# Patient Record
Sex: MALE | Race: WHITE | NOT HISPANIC OR LATINO | Employment: FULL TIME | URBAN - METROPOLITAN AREA
[De-identification: names, ages, dates, MRNs, and addresses within clinical notes are randomized per-mention and may not be internally consistent; named-entity substitution may affect disease eponyms.]

---

## 2023-02-28 PROBLEM — E78.2 HYPERLIPIDEMIA, MIXED: Status: ACTIVE | Noted: 2023-02-28

## 2023-02-28 PROBLEM — R79.89 ABNORMAL TSH: Status: ACTIVE | Noted: 2023-02-28

## 2023-02-28 PROBLEM — N13.2 HYDRONEPHROSIS WITH URINARY OBSTRUCTION DUE TO RENAL CALCULUS: Status: ACTIVE | Noted: 2023-02-28

## 2023-02-28 PROBLEM — I10 HYPERTENSION, ESSENTIAL, BENIGN: Status: ACTIVE | Noted: 2023-02-28

## 2023-02-28 PROBLEM — N20.1 URETERAL STONE: Status: ACTIVE | Noted: 2023-02-28

## 2023-02-28 PROBLEM — R16.0 LIVER MASS, RIGHT LOBE: Status: ACTIVE | Noted: 2023-02-28

## 2023-02-28 PROBLEM — E55.9 VITAMIN D DEFICIENCY: Status: ACTIVE | Noted: 2023-02-28

## 2023-02-28 PROBLEM — K57.30 DIVERTICULOSIS OF COLON: Status: ACTIVE | Noted: 2023-02-28

## 2023-02-28 PROBLEM — R53.83 FATIGUE: Status: ACTIVE | Noted: 2023-02-28

## 2023-02-28 PROBLEM — E66.3 OVERWEIGHT WITH BODY MASS INDEX (BMI) OF 26 TO 26.9 IN ADULT: Status: ACTIVE | Noted: 2023-02-28

## 2023-02-28 PROBLEM — Z90.49 HISTORY OF PARTIAL COLECTOMY: Status: ACTIVE | Noted: 2023-02-28

## 2023-02-28 PROBLEM — N32.0 BLADDER OUTLET OBSTRUCTION: Status: ACTIVE | Noted: 2023-02-28

## 2023-02-28 PROBLEM — M54.9 BACK PAIN: Status: ACTIVE | Noted: 2023-02-28

## 2023-02-28 RX ORDER — LOSARTAN POTASSIUM 25 MG/1
1 TABLET ORAL DAILY
COMMUNITY
End: 2023-06-01

## 2023-02-28 RX ORDER — EZETIMIBE 10 MG/1
1 TABLET ORAL DAILY
COMMUNITY
Start: 2022-03-15 | End: 2024-01-05

## 2023-02-28 RX ORDER — ACETAMINOPHEN 500 MG
1 TABLET ORAL DAILY
COMMUNITY

## 2023-02-28 RX ORDER — ASCORBIC ACID 500 MG
1 TABLET ORAL DAILY
COMMUNITY

## 2023-02-28 RX ORDER — GARLIC 1000 MG
1 CAPSULE ORAL DAILY
COMMUNITY

## 2023-04-06 ENCOUNTER — OFFICE VISIT (OUTPATIENT)
Dept: PRIMARY CARE | Facility: CLINIC | Age: 58
End: 2023-04-06
Payer: COMMERCIAL

## 2023-04-06 VITALS
RESPIRATION RATE: 16 BRPM | WEIGHT: 173 LBS | OXYGEN SATURATION: 98 % | HEART RATE: 78 BPM | DIASTOLIC BLOOD PRESSURE: 88 MMHG | HEIGHT: 68 IN | TEMPERATURE: 98 F | BODY MASS INDEX: 26.22 KG/M2 | SYSTOLIC BLOOD PRESSURE: 122 MMHG

## 2023-04-06 DIAGNOSIS — Z00.00 WELL ADULT EXAM: Primary | ICD-10-CM

## 2023-04-06 DIAGNOSIS — R06.09 DYSPNEA ON EXERTION: ICD-10-CM

## 2023-04-06 DIAGNOSIS — I10 HYPERTENSION, ESSENTIAL, BENIGN: ICD-10-CM

## 2023-04-06 DIAGNOSIS — J02.9 SORE THROAT: ICD-10-CM

## 2023-04-06 DIAGNOSIS — H11.431: ICD-10-CM

## 2023-04-06 DIAGNOSIS — I45.10 INCOMPLETE RIGHT BUNDLE BRANCH BLOCK (RBBB): ICD-10-CM

## 2023-04-06 DIAGNOSIS — G47.33 OBSTRUCTIVE SLEEP APNEA (ADULT) (PEDIATRIC): ICD-10-CM

## 2023-04-06 DIAGNOSIS — R07.89 OTHER CHEST PAIN: ICD-10-CM

## 2023-04-06 PROBLEM — R33.9 URINARY RETENTION: Status: ACTIVE | Noted: 2023-04-06

## 2023-04-06 PROBLEM — R39.198 OTHER DIFFICULTIES WITH MICTURITION: Status: ACTIVE | Noted: 2022-09-21

## 2023-04-06 PROBLEM — H52.4 BILATERAL PRESBYOPIA: Status: ACTIVE | Noted: 2017-07-31

## 2023-04-06 PROBLEM — H53.9 VISUAL CHANGES: Status: ACTIVE | Noted: 2023-04-06

## 2023-04-06 PROBLEM — G47.9 SLEEP DISORDER, UNSPECIFIED: Status: ACTIVE | Noted: 2022-12-02

## 2023-04-06 PROCEDURE — 3074F SYST BP LT 130 MM HG: CPT | Performed by: INTERNAL MEDICINE

## 2023-04-06 PROCEDURE — 99396 PREV VISIT EST AGE 40-64: CPT | Performed by: INTERNAL MEDICINE

## 2023-04-06 PROCEDURE — 93000 ELECTROCARDIOGRAM COMPLETE: CPT | Performed by: INTERNAL MEDICINE

## 2023-04-06 PROCEDURE — 1036F TOBACCO NON-USER: CPT | Performed by: INTERNAL MEDICINE

## 2023-04-06 PROCEDURE — 87635 SARS-COV-2 COVID-19 AMP PRB: CPT

## 2023-04-06 PROCEDURE — U0005 INFEC AGEN DETEC AMPLI PROBE: HCPCS

## 2023-04-06 PROCEDURE — 3079F DIAST BP 80-89 MM HG: CPT | Performed by: INTERNAL MEDICINE

## 2023-04-06 ASSESSMENT — PAIN SCALES - GENERAL: PAINLEVEL: 0-NO PAIN

## 2023-04-06 ASSESSMENT — ENCOUNTER SYMPTOMS
OCCASIONAL FEELINGS OF UNSTEADINESS: 0
DEPRESSION: 0
LOSS OF SENSATION IN FEET: 0

## 2023-04-06 ASSESSMENT — PATIENT HEALTH QUESTIONNAIRE - PHQ9
1. LITTLE INTEREST OR PLEASURE IN DOING THINGS: NOT AT ALL
SUM OF ALL RESPONSES TO PHQ9 QUESTIONS 1 AND 2: 0
2. FEELING DOWN, DEPRESSED OR HOPELESS: NOT AT ALL

## 2023-04-08 LAB — SARS-COV-2 RESULT: NOT DETECTED

## 2023-04-17 PROBLEM — I45.10 INCOMPLETE RIGHT BUNDLE BRANCH BLOCK (RBBB): Status: ACTIVE | Noted: 2023-04-17

## 2023-04-17 PROBLEM — R06.09 DYSPNEA ON EXERTION: Status: ACTIVE | Noted: 2023-04-17

## 2023-04-17 NOTE — ASSESSMENT & PLAN NOTE
Desean presents today for his annual wellness exam we reviewed his previous blood work and during the course of our discussion we reviewed his family history of coronary artery disease and a father who had a CABG in his early 60s.  He does admit to having some shortness of breath with activity and this has been a change for him.  We discussed options here and will order stress testing and consider cardiology evaluation prior to ordering additional studies.  His EKG shows no acute changes although it does show a right bundle branch block.

## 2023-04-27 ENCOUNTER — OFFICE VISIT (OUTPATIENT)
Dept: PRIMARY CARE | Facility: CLINIC | Age: 58
End: 2023-04-27
Payer: COMMERCIAL

## 2023-04-27 VITALS
OXYGEN SATURATION: 95 % | SYSTOLIC BLOOD PRESSURE: 110 MMHG | DIASTOLIC BLOOD PRESSURE: 76 MMHG | WEIGHT: 171.2 LBS | HEIGHT: 68 IN | TEMPERATURE: 97.6 F | HEART RATE: 74 BPM | BODY MASS INDEX: 25.94 KG/M2 | RESPIRATION RATE: 18 BRPM

## 2023-04-27 DIAGNOSIS — K21.9 GASTROESOPHAGEAL REFLUX DISEASE, UNSPECIFIED WHETHER ESOPHAGITIS PRESENT: ICD-10-CM

## 2023-04-27 DIAGNOSIS — R05.1 ACUTE COUGH: Primary | ICD-10-CM

## 2023-04-27 DIAGNOSIS — E78.2 HYPERLIPIDEMIA, MIXED: ICD-10-CM

## 2023-04-27 PROBLEM — H11.431 CONJUNCTIVAL HYPEREMIA, RIGHT EYE: Status: ACTIVE | Noted: 2023-04-06

## 2023-04-27 PROBLEM — G47.33 OBSTRUCTIVE SLEEP APNEA (ADULT) (PEDIATRIC): Status: RESOLVED | Noted: 2022-12-02 | Resolved: 2023-04-27

## 2023-04-27 PROCEDURE — 3078F DIAST BP <80 MM HG: CPT | Performed by: INTERNAL MEDICINE

## 2023-04-27 PROCEDURE — 1036F TOBACCO NON-USER: CPT | Performed by: INTERNAL MEDICINE

## 2023-04-27 PROCEDURE — 99213 OFFICE O/P EST LOW 20 MIN: CPT | Performed by: INTERNAL MEDICINE

## 2023-04-27 PROCEDURE — 3074F SYST BP LT 130 MM HG: CPT | Performed by: INTERNAL MEDICINE

## 2023-04-27 RX ORDER — PANTOPRAZOLE SODIUM 40 MG/1
40 TABLET, DELAYED RELEASE ORAL DAILY
Qty: 90 TABLET | Refills: 3 | Status: SHIPPED | OUTPATIENT
Start: 2023-04-27 | End: 2024-04-05

## 2023-04-27 ASSESSMENT — ENCOUNTER SYMPTOMS
SHORTNESS OF BREATH: 0
COUGH: 1
FEVER: 0

## 2023-04-27 ASSESSMENT — PAIN SCALES - GENERAL: PAINLEVEL: 0-NO PAIN

## 2023-04-27 NOTE — PROGRESS NOTES
"Subjective   Yeison Olivera is a 58 y.o. male who presents for sore throat, red eye.  Patient did have his stress test done   Patient did not get labs done     Patient states that the red eye hasn't happened since the last visit   Sore throat is gone but now has a productive cough mainly in the morning, has been taking some allergy medicine and it seems to help a little       Cough  This is a recurrent problem. The current episode started 1 to 4 weeks ago. The problem has been gradually improving. The problem occurs hourly. The cough is Productive of sputum. Associated symptoms include nasal congestion. Pertinent negatives include no chest pain, fever or shortness of breath. Nothing aggravates the symptoms. The treatment provided mild relief.       Review of Systems   Constitutional:  Negative for fever.   Respiratory:  Positive for cough. Negative for shortness of breath.    Cardiovascular:  Negative for chest pain.       Objective   /76   Pulse 74   Temp 36.4 °C (97.6 °F)   Resp 18   Ht 1.734 m (5' 8.25\")   Wt 77.7 kg (171 lb 3.2 oz)   SpO2 95%   BMI 25.84 kg/m²       Physical Exam  Constitutional:       Appearance: Normal appearance.   HENT:      Head: Normocephalic.   Cardiovascular:      Rate and Rhythm: Normal rate and regular rhythm.   Pulmonary:      Effort: Pulmonary effort is normal.   Musculoskeletal:      Cervical back: Neck supple.      Right lower leg: No edema.      Left lower leg: No edema.   Skin:     General: Skin is warm and dry.   Psychiatric:         Mood and Affect: Mood normal.         Assessment/Plan   Problem List Items Addressed This Visit          Digestive    Gastroesophageal reflux disease    Relevant Medications    pantoprazole (ProtoNix) 40 mg EC tablet       Other    Hyperlipidemia, mixed    Acute cough - Primary    Relevant Medications    pantoprazole (ProtoNix) 40 mg EC tablet    Other Relevant Orders    Colonoscopy    EGD     Encounter Diagnoses   Name Primary?    " Acute cough Yes    Gastroesophageal reflux disease, unspecified whether esophagitis present     Hyperlipidemia, mixed      Daniel Juan, DO

## 2023-06-01 DIAGNOSIS — I10 HYPERTENSION, ESSENTIAL, BENIGN: Primary | ICD-10-CM

## 2023-06-01 RX ORDER — LOSARTAN POTASSIUM 25 MG/1
TABLET ORAL
Qty: 90 TABLET | Refills: 3 | Status: SHIPPED | OUTPATIENT
Start: 2023-06-01 | End: 2024-05-28

## 2023-10-27 ENCOUNTER — TELEPHONE (OUTPATIENT)
Dept: PRIMARY CARE | Facility: CLINIC | Age: 58
End: 2023-10-27

## 2023-10-29 DIAGNOSIS — Z00.00 WELL ADULT HEALTH CHECK: Primary | ICD-10-CM

## 2023-12-04 ENCOUNTER — ANESTHESIA EVENT (OUTPATIENT)
Dept: GASTROENTEROLOGY | Facility: EXTERNAL LOCATION | Age: 58
End: 2023-12-04

## 2023-12-12 ENCOUNTER — HOSPITAL ENCOUNTER (OUTPATIENT)
Dept: GASTROENTEROLOGY | Facility: EXTERNAL LOCATION | Age: 58
Discharge: HOME | End: 2023-12-12
Payer: COMMERCIAL

## 2023-12-12 ENCOUNTER — ANESTHESIA (OUTPATIENT)
Dept: GASTROENTEROLOGY | Facility: EXTERNAL LOCATION | Age: 58
End: 2023-12-12

## 2023-12-12 VITALS
BODY MASS INDEX: 26.11 KG/M2 | SYSTOLIC BLOOD PRESSURE: 140 MMHG | TEMPERATURE: 96.8 F | RESPIRATION RATE: 22 BRPM | OXYGEN SATURATION: 98 % | HEART RATE: 65 BPM | DIASTOLIC BLOOD PRESSURE: 93 MMHG | WEIGHT: 173 LBS

## 2023-12-12 DIAGNOSIS — Z00.00 WELL ADULT HEALTH CHECK: ICD-10-CM

## 2023-12-12 PROCEDURE — 88305 TISSUE EXAM BY PATHOLOGIST: CPT

## 2023-12-12 PROCEDURE — 0753T DGTZ GLS MCRSCP SLD LEVEL IV: CPT

## 2023-12-12 PROCEDURE — 45385 COLONOSCOPY W/LESION REMOVAL: CPT | Performed by: STUDENT IN AN ORGANIZED HEALTH CARE EDUCATION/TRAINING PROGRAM

## 2023-12-12 PROCEDURE — 88305 TISSUE EXAM BY PATHOLOGIST: CPT | Performed by: PATHOLOGY

## 2023-12-12 RX ORDER — SODIUM CHLORIDE 9 MG/ML
20 INJECTION, SOLUTION INTRAVENOUS CONTINUOUS
Status: DISCONTINUED | OUTPATIENT
Start: 2023-12-12 | End: 2023-12-13 | Stop reason: HOSPADM

## 2023-12-12 RX ORDER — ONDANSETRON HYDROCHLORIDE 2 MG/ML
4 INJECTION, SOLUTION INTRAVENOUS ONCE AS NEEDED
Status: DISCONTINUED | OUTPATIENT
Start: 2023-12-12 | End: 2023-12-13 | Stop reason: HOSPADM

## 2023-12-12 RX ORDER — PROPOFOL 10 MG/ML
INJECTION, EMULSION INTRAVENOUS AS NEEDED
Status: DISCONTINUED | OUTPATIENT
Start: 2023-12-12 | End: 2023-12-12

## 2023-12-12 RX ORDER — LIDOCAINE HYDROCHLORIDE 20 MG/ML
INJECTION, SOLUTION INFILTRATION; PERINEURAL AS NEEDED
Status: DISCONTINUED | OUTPATIENT
Start: 2023-12-12 | End: 2023-12-12

## 2023-12-12 RX ADMIN — PROPOFOL 50 MG: 10 INJECTION, EMULSION INTRAVENOUS at 14:04

## 2023-12-12 RX ADMIN — PROPOFOL 50 MG: 10 INJECTION, EMULSION INTRAVENOUS at 13:51

## 2023-12-12 RX ADMIN — SODIUM CHLORIDE: 9 INJECTION, SOLUTION INTRAVENOUS at 13:44

## 2023-12-12 RX ADMIN — PROPOFOL 30 MG: 10 INJECTION, EMULSION INTRAVENOUS at 14:14

## 2023-12-12 RX ADMIN — PROPOFOL 50 MG: 10 INJECTION, EMULSION INTRAVENOUS at 13:49

## 2023-12-12 RX ADMIN — PROPOFOL 50 MG: 10 INJECTION, EMULSION INTRAVENOUS at 13:55

## 2023-12-12 RX ADMIN — PROPOFOL 50 MG: 10 INJECTION, EMULSION INTRAVENOUS at 13:53

## 2023-12-12 RX ADMIN — PROPOFOL 100 MG: 10 INJECTION, EMULSION INTRAVENOUS at 13:48

## 2023-12-12 RX ADMIN — PROPOFOL 50 MG: 10 INJECTION, EMULSION INTRAVENOUS at 13:59

## 2023-12-12 RX ADMIN — PROPOFOL 20 MG: 10 INJECTION, EMULSION INTRAVENOUS at 14:10

## 2023-12-12 RX ADMIN — PROPOFOL 50 MG: 10 INJECTION, EMULSION INTRAVENOUS at 13:57

## 2023-12-12 RX ADMIN — PROPOFOL 50 MG: 10 INJECTION, EMULSION INTRAVENOUS at 14:01

## 2023-12-12 RX ADMIN — LIDOCAINE HYDROCHLORIDE 5 ML: 20 INJECTION, SOLUTION INFILTRATION; PERINEURAL at 13:48

## 2023-12-12 ASSESSMENT — PAIN - FUNCTIONAL ASSESSMENT
PAIN_FUNCTIONAL_ASSESSMENT: 0-10

## 2023-12-12 ASSESSMENT — PAIN SCALES - GENERAL
PAINLEVEL_OUTOF10: 0 - NO PAIN
PAINLEVEL_OUTOF10: 0 - NO PAIN
PAIN_LEVEL: 0
PAINLEVEL_OUTOF10: 0 - NO PAIN
PAINLEVEL_OUTOF10: 0 - NO PAIN

## 2023-12-12 ASSESSMENT — COLUMBIA-SUICIDE SEVERITY RATING SCALE - C-SSRS
1. IN THE PAST MONTH, HAVE YOU WISHED YOU WERE DEAD OR WISHED YOU COULD GO TO SLEEP AND NOT WAKE UP?: NO
6. HAVE YOU EVER DONE ANYTHING, STARTED TO DO ANYTHING, OR PREPARED TO DO ANYTHING TO END YOUR LIFE?: NO
2. HAVE YOU ACTUALLY HAD ANY THOUGHTS OF KILLING YOURSELF?: NO

## 2023-12-12 NOTE — ANESTHESIA PREPROCEDURE EVALUATION
Patient: Yeison Olivera    Procedure Information       Date/Time: 12/12/23 1230    Scheduled providers: Elsy Brunner MD    Procedure: COLONOSCOPY    Location: [facility]            Relevant Problems   Cardiovascular   (+) Hyperlipidemia, mixed   (+) Hypertension, essential, benign   (+) Incomplete right bundle branch block (RBBB)      GI   (+) Gastroesophageal reflux disease      /Renal   (+) Hydronephrosis with urinary obstruction due to renal calculus      Pulmonary   (+) Dyspnea on exertion       Clinical information reviewed:    Allergies                NPO Detail:  No data recorded     Physical Exam    Airway  Mallampati: II  TM distance: >3 FB  Neck ROM: full     Cardiovascular   Rhythm: regular     Dental    Pulmonary    Abdominal            Anesthesia Plan    ASA 2     MAC     intravenous induction   Anesthetic plan and risks discussed with patient.    Plan discussed with CRNA.

## 2023-12-12 NOTE — ANESTHESIA POSTPROCEDURE EVALUATION
Patient: Yeison Olivera    Procedure Summary       Date: 12/12/23 Room / Location: [facility]    Anesthesia Start: 1344 Anesthesia Stop: 1428    Procedure: COLONOSCOPY Diagnosis: Well adult health check    Scheduled Providers: Elsy Brunner MD Responsible Provider: ZELDA Hall    Anesthesia Type: MAC ASA Status: 2            Anesthesia Type: MAC    Vitals Value Taken Time   /96 12/12/23 1424   Temp 36 °C (96.8 °F) 12/12/23 1424   Pulse 74 12/12/23 1424   Resp 18 12/12/23 1424   SpO2 96 % 12/12/23 1424       Anesthesia Post Evaluation    Patient location during evaluation: bedside  Patient participation: complete - patient participated  Level of consciousness: awake  Pain score: 0  Pain management: adequate  Airway patency: patent  Cardiovascular status: acceptable  Respiratory status: acceptable  Hydration status: acceptable  Postoperative Nausea and Vomiting: none    There were no known notable events for this encounter.

## 2023-12-12 NOTE — H&P
Procedure H&P    Patient Profile-Procedures  Name Yeison Olivera  Date of Birth 1965  MRN [id number]  Address   [address] [place] [id number] [place] [address]    Primary Phone Number [phone]  Secondary Phone Number    PCP Daniel Juan    Procedure(s):  Procedures: Colonoscopy  Primary contact name and number   Extended Emergency Contact Information  Primary Emergency Contact: Geena Olivera  Home Phone: [phone]  Relation: Spouse    General Health  Weight   Vitals:    12/12/23 1218   Weight: 78.5 kg (173 lb)     BMI Body mass index is 26.11 kg/m².    Allergies  No Known Allergies    Past Medical History   Past Medical History:   Diagnosis Date    Allergic contact dermatitis due to plants, except food 09/21/2020    Contact dermatitis due to poison ivy    Bladder-neck obstruction 03/15/2022    Bladder outlet obstruction    Contact with and (suspected) exposure to covid-19 12/04/2020    Suspected COVID-19 virus infection    Hypertension     Personal history of other diseases of the digestive system 10/26/2020    History of diverticulitis of colon    Personal history of other diseases of urinary system 09/22/2022    History of urethral stricture    Personal history of other specified conditions 02/14/2020    History of snoring    Personal history of other specified conditions 02/01/2021    History of headache    Somnolence 02/14/2020    Daytime somnolence    Unspecified anterior urethral stricture, male     Stricture of anterior urethra in male, unspecified stricture type       Provider assessment  Diagnosis: Colon Cancer Screening/Surveillance   Medication Reviewed - yes  Prior to Admission medications    Medication Sig Start Date End Date Taking? Authorizing Provider   APPLE CIDER VINEGAR ORAL Take 1 capsule by mouth once daily.   Yes Historical Provider, MD   ascorbic acid (Vitamin C) 500 mg tablet Take 1 tablet (500 mg) by mouth once daily.   Yes Historical  Provider, MD   cholecalciferol (Vitamin D-3) 5,000 Units tablet Take 1 tablet (5,000 Units) by mouth once daily.   Yes Historical Provider, MD   ezetimibe (Zetia) 10 mg tablet Take 1 tablet (10 mg) by mouth once daily. 3/15/22  Yes Historical Provider, MD   fish oil concentrate (Omega-3) 120-180 mg capsule Take 1 capsule (1 g) by mouth once daily. 3/15/22  Yes Historical Provider, MD   garlic 1,000 mg capsule Take 1 capsule by mouth once daily.   Yes Historical Provider, MD   losartan (Cozaar) 25 mg tablet TAKE 1 TABLET DAILY 6/1/23  Yes Festus Caldera APRN-CNP   pantoprazole (ProtoNix) 40 mg EC tablet Take 1 tablet (40 mg) by mouth once daily. Do not crush, chew, or split. 4/27/23 4/26/24 Yes Daniel Juan, DO   vit E acet/selenium (VITAMIN E ACETATE-SELENIUM ORAL) Take 1 capsule by mouth once daily. Vitamin E/Selenium 400-50 UNIT-MCG CAPS   Yes Historical Provider, MD   vitamin-B complex split tablet Take 1 tablet (2 half tablet) by mouth once daily. Super Stress B-Complex CR TBCR   Yes Historical Provider, MD       Physical Exam  Vitals:    12/12/23 1218   BP: (!) 138/107   Pulse: 79   Resp: 19   Temp: 36.4 °C (97.5 °F)   SpO2: 98%        General: A&Ox3, NAD.  HEENT: AT/NC.   CV: RRR. No murmur.  Resp: CTA bilaterally. No wheezing, rhonchi or rales.   GI: Soft, NT/ND. BSx4.  Extrem: No edema. Pulses intact.  Skin: No Jaundice.   Neuro: No focal deficits.   Psych: Normal mood and affect.      Procedure Plan - pre-procedural (re)assesment completed by physician:  discharge/transfer patient when discharge criteria met    Elsy Brunner MD  12/12/2023 1:40 PM

## 2023-12-12 NOTE — DISCHARGE INSTRUCTIONS
Patient Instructions Post Procedure      The anesthetics, sedatives or narcotics which were given to you today will be acting in your body for the next 24 hours, so you might feel a little sleepy or groggy.  This feeling should slowly wear off. Carefully read and follow the instructions.     You received sedation today:  - Do not drive or operate any machinery or power tools of any kind.   - No alcoholic beverages today, not even beer or wine.  - Do not make any important decisions or sign any legal documents.  - No over the counter medications that contain alcohol or that may cause drowsiness.    While it is common to experience mild to moderate abdominal distention, gas, or belching after your procedure, if any of these symptoms occur following discharge from the GI Lab or within one week of having your procedure, call the [facility] to be advised whether a visit to your nearest Urgent Care or Emergency Department is indicated.  Take this paper with you if you go.   - If you develop an allergic reaction to the medications that were given during your procedure such as difficulty breathing, rash, hives, severe nausea, vomiting or lightheadedness.  - If you experience chest pain, shortness of breath, severe abdominal pain, fevers and chills.  -If you develop signs and symptoms of bleeding such as blood in your spit, if your stools turn black, tarry, or bloody  - If you have not urinated within 8 hours following your procedure.  - If your IV site becomes painful, red, inflamed, or looks infected.    If you received a biopsy/polypectomy/sphincterotomy the following instructions apply below:  __ Do not use Aspirin containing products, non-steroidal medications or anti-coagulants for one week following your procedure. (Examples of these types of medications are: Advil, Arthrotec, Aleve, Coumadin, Ecotrin, Heparin, Ibuprofen, Indocin, Motrin, Naprosyn, Nuprin, Plavix, Vioxx, and Voltarin, or their generic  forms.  This list is not all-inclusive.  Check with your physician or pharmacist before resuming medications.)   __ Eat a soft diet today.  Avoid foods that are poorly digested for the next 24 hours.  These foods would include: nuts, beans, lettuce, red meats, and fried foods. Start with liquids and advance your diet as tolerated, gradually work up to eating solids.   __ Do not have a Barium Study or Enema for one week.    Your physician recommends the additional following instructions:    -You have a contact number available for emergencies. The signs and symptoms of potential delayed complications were discussed with you. You may return to normal activities tomorrow.  -Resume your previous diet or other if specified.  -Continue your present medications.   -We are waiting for your pathology results, if applicable.  -The findings and recommendations have been discussed with you and/or family.  - Please see Medication Reconciliation Form for new medication/medications prescribed.     If you experience any problems or have any questions following discharge from the GI Lab, please call: [phone] from 7 am- 4:30 pm.  In the event of an emergency please go to the closest Emergency Department or call

## 2023-12-12 NOTE — Clinical Note
Patient tolerated the procedure well and is comfortable with no complaints of pain. Vital signs stable. Arousable prior to transport. Patient transported to PACU via stretcher. Handoff completed. Abd soft non tender

## 2023-12-21 LAB
LABORATORY COMMENT REPORT: NORMAL
PATH REPORT.FINAL DX SPEC: NORMAL
PATH REPORT.GROSS SPEC: NORMAL
PATH REPORT.TOTAL CANCER: NORMAL

## 2024-01-05 ENCOUNTER — TELEPHONE (OUTPATIENT)
Dept: PRIMARY CARE | Facility: CLINIC | Age: 59
End: 2024-01-05

## 2024-01-05 DIAGNOSIS — Z12.5 ENCOUNTER FOR PROSTATE CANCER SCREENING: Primary | ICD-10-CM

## 2024-01-05 DIAGNOSIS — E55.9 VITAMIN D DEFICIENCY: ICD-10-CM

## 2024-01-05 DIAGNOSIS — Z00.00 WELL ADULT HEALTH CHECK: ICD-10-CM

## 2024-01-05 DIAGNOSIS — E78.2 MIXED HYPERLIPIDEMIA: ICD-10-CM

## 2024-01-05 RX ORDER — EZETIMIBE 10 MG/1
10 TABLET ORAL DAILY
Qty: 90 TABLET | Refills: 3 | Status: SHIPPED | OUTPATIENT
Start: 2024-01-05

## 2024-03-05 ENCOUNTER — OFFICE VISIT (OUTPATIENT)
Dept: PRIMARY CARE | Facility: CLINIC | Age: 59
End: 2024-03-05
Payer: COMMERCIAL

## 2024-03-05 VITALS
HEIGHT: 68 IN | WEIGHT: 172.6 LBS | HEART RATE: 73 BPM | RESPIRATION RATE: 14 BRPM | DIASTOLIC BLOOD PRESSURE: 68 MMHG | BODY MASS INDEX: 26.16 KG/M2 | SYSTOLIC BLOOD PRESSURE: 124 MMHG | OXYGEN SATURATION: 98 %

## 2024-03-05 DIAGNOSIS — H66.90 ACUTE OTITIS MEDIA, UNSPECIFIED OTITIS MEDIA TYPE: Primary | ICD-10-CM

## 2024-03-05 DIAGNOSIS — R42 VERTIGO: ICD-10-CM

## 2024-03-05 PROCEDURE — 99212 OFFICE O/P EST SF 10 MIN: CPT | Performed by: NURSE PRACTITIONER

## 2024-03-05 PROCEDURE — 3074F SYST BP LT 130 MM HG: CPT | Performed by: NURSE PRACTITIONER

## 2024-03-05 PROCEDURE — 1036F TOBACCO NON-USER: CPT | Performed by: NURSE PRACTITIONER

## 2024-03-05 PROCEDURE — 3078F DIAST BP <80 MM HG: CPT | Performed by: NURSE PRACTITIONER

## 2024-03-05 RX ORDER — AMOXICILLIN AND CLAVULANATE POTASSIUM 875; 125 MG/1; MG/1
1 TABLET, FILM COATED ORAL 2 TIMES DAILY
Qty: 14 TABLET | Refills: 0 | Status: SHIPPED | OUTPATIENT
Start: 2024-03-05 | End: 2024-03-12

## 2024-03-05 RX ORDER — SCOLOPAMINE TRANSDERMAL SYSTEM 1 MG/1
1 PATCH, EXTENDED RELEASE TRANSDERMAL
Qty: 5 PATCH | Refills: 0 | Status: SHIPPED | OUTPATIENT
Start: 2024-03-05 | End: 2024-04-04

## 2024-03-05 ASSESSMENT — ENCOUNTER SYMPTOMS
FEVER: 0
LIGHT-HEADEDNESS: 0
DIZZINESS: 1
COUGH: 0
SHORTNESS OF BREATH: 0
HEADACHES: 0

## 2024-03-05 NOTE — PROGRESS NOTES
"Subjective   Patient ID: Yeison Olivera is a 58 y.o. male who presents for Dizziness (Sunday afternoon/ pt was sick last week/ Throwing up/) and Vertigo (Possible).    He has a history of vertigo but this time is worse, meclizine is not helping and feels congested and ears feel full.          Review of Systems   Constitutional:  Negative for fever.   HENT:  Positive for congestion.    Respiratory:  Negative for cough and shortness of breath.    Neurological:  Positive for dizziness. Negative for light-headedness and headaches.       Objective   /68   Pulse 73   Resp 14   Ht 1.727 m (5' 8\")   Wt 78.3 kg (172 lb 9.6 oz)   SpO2 98%   BMI 26.24 kg/m²     Physical Exam  Vitals and nursing note reviewed.   Constitutional:       General: He is not in acute distress.  HENT:      Head: Normocephalic and atraumatic.      Right Ear: No middle ear effusion.      Left Ear: Tenderness present. A middle ear effusion is present. Tympanic membrane is bulging.   Eyes:      Pupils: Pupils are equal, round, and reactive to light.   Cardiovascular:      Rate and Rhythm: Normal rate and regular rhythm.   Pulmonary:      Effort: Pulmonary effort is normal.      Breath sounds: Normal breath sounds.   Skin:     General: Skin is warm and dry.   Neurological:      Mental Status: He is alert.   Psychiatric:         Mood and Affect: Mood normal.         Assessment/Plan   Problem List Items Addressed This Visit    None  Visit Diagnoses         Codes    Acute otitis media, unspecified otitis media type    -  Primary H66.90    Relevant Medications    amoxicillin-pot clavulanate (Augmentin) 875-125 mg tablet    Vertigo     R42    Relevant Medications    scopolamine (Transderm-Scop) 1 mg over 3 days patch 3 day               "

## 2024-03-05 NOTE — LETTER
March 5, 2024     Patient: Yeison Olivera   YOB: 1965   Date of Visit: 3/5/2024       To Whom It May Concern:    Yeison Olivera was seen in my clinic on 3/5/2024 at 3:40 pm. Please excuse Yeison for his absence from work on this day to make the appointment and 3/4/24 and 3/6/24    If you have any questions or concerns, please don't hesitate to call.         Sincerely,         Anastasia Baker, APRN-CNP        CC: No Recipients

## 2024-03-26 ENCOUNTER — TELEPHONE (OUTPATIENT)
Dept: PRIMARY CARE | Facility: CLINIC | Age: 59
End: 2024-03-26
Payer: COMMERCIAL

## 2024-04-04 DIAGNOSIS — R05.1 ACUTE COUGH: ICD-10-CM

## 2024-04-04 DIAGNOSIS — K21.9 GASTROESOPHAGEAL REFLUX DISEASE, UNSPECIFIED WHETHER ESOPHAGITIS PRESENT: ICD-10-CM

## 2024-04-05 RX ORDER — PANTOPRAZOLE SODIUM 40 MG/1
40 TABLET, DELAYED RELEASE ORAL DAILY
Qty: 90 TABLET | Refills: 3 | Status: SHIPPED | OUTPATIENT
Start: 2024-04-05 | End: 2024-05-07 | Stop reason: SDUPTHER

## 2024-04-09 ENCOUNTER — APPOINTMENT (OUTPATIENT)
Dept: PRIMARY CARE | Facility: CLINIC | Age: 59
End: 2024-04-09
Payer: COMMERCIAL

## 2024-04-27 ENCOUNTER — LAB (OUTPATIENT)
Dept: LAB | Facility: LAB | Age: 59
End: 2024-04-27
Payer: COMMERCIAL

## 2024-04-27 DIAGNOSIS — E55.9 VITAMIN D DEFICIENCY: ICD-10-CM

## 2024-04-27 DIAGNOSIS — Z12.5 ENCOUNTER FOR PROSTATE CANCER SCREENING: ICD-10-CM

## 2024-04-27 DIAGNOSIS — Z00.00 WELL ADULT HEALTH CHECK: ICD-10-CM

## 2024-04-27 LAB
25(OH)D3 SERPL-MCNC: 54 NG/ML (ref 30–100)
ALBUMIN SERPL BCP-MCNC: 4.5 G/DL (ref 3.4–5)
ALP SERPL-CCNC: 59 U/L (ref 33–120)
ALT SERPL W P-5'-P-CCNC: 24 U/L (ref 10–52)
ANION GAP SERPL CALC-SCNC: 11 MMOL/L (ref 10–20)
AST SERPL W P-5'-P-CCNC: 20 U/L (ref 9–39)
BILIRUB SERPL-MCNC: 0.6 MG/DL (ref 0–1.2)
BUN SERPL-MCNC: 12 MG/DL (ref 6–23)
CALCIUM SERPL-MCNC: 8.6 MG/DL (ref 8.6–10.3)
CHLORIDE SERPL-SCNC: 107 MMOL/L (ref 98–107)
CHOLEST SERPL-MCNC: 192 MG/DL (ref 0–199)
CHOLESTEROL/HDL RATIO: 4.7
CO2 SERPL-SCNC: 26 MMOL/L (ref 21–32)
CREAT SERPL-MCNC: 1.03 MG/DL (ref 0.5–1.3)
EGFRCR SERPLBLD CKD-EPI 2021: 84 ML/MIN/1.73M*2
ERYTHROCYTE [DISTWIDTH] IN BLOOD BY AUTOMATED COUNT: 12.8 % (ref 11.5–14.5)
GLUCOSE SERPL-MCNC: 120 MG/DL (ref 74–99)
HCT VFR BLD AUTO: 47.3 % (ref 41–52)
HDLC SERPL-MCNC: 41 MG/DL
HGB BLD-MCNC: 16.6 G/DL (ref 13.5–17.5)
LDLC SERPL CALC-MCNC: 121 MG/DL
MCH RBC QN AUTO: 30.2 PG (ref 26–34)
MCHC RBC AUTO-ENTMCNC: 35.1 G/DL (ref 32–36)
MCV RBC AUTO: 86 FL (ref 80–100)
NON HDL CHOLESTEROL: 151 MG/DL (ref 0–149)
NRBC BLD-RTO: 0 /100 WBCS (ref 0–0)
PLATELET # BLD AUTO: 207 X10*3/UL (ref 150–450)
POTASSIUM SERPL-SCNC: 3.9 MMOL/L (ref 3.5–5.3)
PROT SERPL-MCNC: 7.1 G/DL (ref 6.4–8.2)
PSA SERPL-MCNC: 0.44 NG/ML
RBC # BLD AUTO: 5.5 X10*6/UL (ref 4.5–5.9)
SODIUM SERPL-SCNC: 140 MMOL/L (ref 136–145)
T4 FREE SERPL-MCNC: 0.51 NG/DL (ref 0.61–1.12)
TRIGL SERPL-MCNC: 151 MG/DL (ref 0–149)
TSH SERPL-ACNC: 4.56 MIU/L (ref 0.44–3.98)
VLDL: 30 MG/DL (ref 0–40)
WBC # BLD AUTO: 4.6 X10*3/UL (ref 4.4–11.3)

## 2024-04-27 PROCEDURE — 84443 ASSAY THYROID STIM HORMONE: CPT

## 2024-04-27 PROCEDURE — 84439 ASSAY OF FREE THYROXINE: CPT

## 2024-04-27 PROCEDURE — 85027 COMPLETE CBC AUTOMATED: CPT

## 2024-04-27 PROCEDURE — 80053 COMPREHEN METABOLIC PANEL: CPT

## 2024-04-27 PROCEDURE — 84153 ASSAY OF PSA TOTAL: CPT

## 2024-04-27 PROCEDURE — 82306 VITAMIN D 25 HYDROXY: CPT

## 2024-04-27 PROCEDURE — 80061 LIPID PANEL: CPT

## 2024-04-27 PROCEDURE — 36415 COLL VENOUS BLD VENIPUNCTURE: CPT

## 2024-05-07 ENCOUNTER — OFFICE VISIT (OUTPATIENT)
Dept: PRIMARY CARE | Facility: CLINIC | Age: 59
End: 2024-05-07
Payer: COMMERCIAL

## 2024-05-07 VITALS
HEIGHT: 68 IN | TEMPERATURE: 97.8 F | OXYGEN SATURATION: 98 % | WEIGHT: 173.6 LBS | RESPIRATION RATE: 16 BRPM | BODY MASS INDEX: 26.31 KG/M2 | HEART RATE: 68 BPM | SYSTOLIC BLOOD PRESSURE: 114 MMHG | DIASTOLIC BLOOD PRESSURE: 66 MMHG

## 2024-05-07 DIAGNOSIS — K21.9 GASTROESOPHAGEAL REFLUX DISEASE, UNSPECIFIED WHETHER ESOPHAGITIS PRESENT: ICD-10-CM

## 2024-05-07 DIAGNOSIS — S43.421A SPRAIN OF RIGHT ROTATOR CUFF CAPSULE, INITIAL ENCOUNTER: ICD-10-CM

## 2024-05-07 DIAGNOSIS — E78.2 HYPERLIPIDEMIA, MIXED: ICD-10-CM

## 2024-05-07 DIAGNOSIS — I10 HYPERTENSION, ESSENTIAL, BENIGN: ICD-10-CM

## 2024-05-07 DIAGNOSIS — E66.3 OVERWEIGHT WITH BODY MASS INDEX (BMI) OF 26 TO 26.9 IN ADULT: ICD-10-CM

## 2024-05-07 DIAGNOSIS — R79.89 ABNORMAL TSH: ICD-10-CM

## 2024-05-07 DIAGNOSIS — M75.41 ROTATOR CUFF IMPINGEMENT SYNDROME, RIGHT: ICD-10-CM

## 2024-05-07 DIAGNOSIS — Z00.00 WELL ADULT HEALTH CHECK: Primary | ICD-10-CM

## 2024-05-07 PROBLEM — D18.03 HEPATIC HEMANGIOMA: Status: ACTIVE | Noted: 2023-02-28

## 2024-05-07 PROBLEM — N32.0 BLADDER OUTLET OBSTRUCTION: Status: RESOLVED | Noted: 2023-02-28 | Resolved: 2024-05-07

## 2024-05-07 PROBLEM — K57.32 DIVERTICULITIS OF COLON: Status: RESOLVED | Noted: 2023-02-28 | Resolved: 2024-05-07

## 2024-05-07 PROBLEM — R39.198 OTHER DIFFICULTIES WITH MICTURITION: Status: RESOLVED | Noted: 2022-09-21 | Resolved: 2024-05-07

## 2024-05-07 PROBLEM — G47.9 SLEEP DISORDER, UNSPECIFIED: Status: RESOLVED | Noted: 2022-12-02 | Resolved: 2024-05-07

## 2024-05-07 PROBLEM — K57.32 DIVERTICULITIS OF COLON: Status: ACTIVE | Noted: 2023-02-28

## 2024-05-07 PROBLEM — R53.83 FATIGUE: Status: RESOLVED | Noted: 2023-02-28 | Resolved: 2024-05-07

## 2024-05-07 PROBLEM — N13.2 HYDRONEPHROSIS WITH URINARY OBSTRUCTION DUE TO RENAL CALCULUS: Status: RESOLVED | Noted: 2023-02-28 | Resolved: 2024-05-07

## 2024-05-07 PROBLEM — R05.1 ACUTE COUGH: Status: RESOLVED | Noted: 2023-04-27 | Resolved: 2024-05-07

## 2024-05-07 PROBLEM — R33.9 URINARY RETENTION: Status: RESOLVED | Noted: 2023-04-06 | Resolved: 2024-05-07

## 2024-05-07 PROBLEM — N20.1 URETERAL STONE: Status: RESOLVED | Noted: 2023-02-28 | Resolved: 2024-05-07

## 2024-05-07 PROBLEM — M54.9 BACK PAIN: Status: RESOLVED | Noted: 2023-02-28 | Resolved: 2024-05-07

## 2024-05-07 PROCEDURE — 3074F SYST BP LT 130 MM HG: CPT | Performed by: INTERNAL MEDICINE

## 2024-05-07 PROCEDURE — 3078F DIAST BP <80 MM HG: CPT | Performed by: INTERNAL MEDICINE

## 2024-05-07 PROCEDURE — 3008F BODY MASS INDEX DOCD: CPT | Performed by: INTERNAL MEDICINE

## 2024-05-07 PROCEDURE — 99396 PREV VISIT EST AGE 40-64: CPT | Performed by: INTERNAL MEDICINE

## 2024-05-07 RX ORDER — PANTOPRAZOLE SODIUM 40 MG/1
40 TABLET, DELAYED RELEASE ORAL DAILY
Qty: 90 TABLET | Refills: 3 | Status: SHIPPED | OUTPATIENT
Start: 2024-05-07 | End: 2025-05-07

## 2024-05-07 ASSESSMENT — PAIN SCALES - GENERAL: PAINLEVEL: 0-NO PAIN

## 2024-05-07 NOTE — PROGRESS NOTES
"Subjective   Yeison Olivera is a 59 y.o. male who presents for Annual Exam.  Review labs   Refill pantoprazole if patient still needs to be on it   Well Adult Physical   Patient here for a comprehensive physical exam.The patient reports no problems    Do you take any herbs or supplements that were not prescribed by a doctor? yes  Are you taking calcium supplements? yes  Are you taking aspirin daily? no     History:  Patient receives prostate care outside our clinic  Date last PSA: 4.27.2024  Colonoscopy: 12.12.2023        Review of Systems   All other systems reviewed and are negative.      Objective   /66   Pulse 68   Temp 36.6 °C (97.8 °F)   Resp 16   Ht 1.727 m (5' 8\")   Wt 78.7 kg (173 lb 9.6 oz)   SpO2 98%   BMI 26.40 kg/m²       Physical Exam  Constitutional:       Appearance: Normal appearance.   HENT:      Head: Normocephalic.   Eyes:      Conjunctiva/sclera: Conjunctivae normal.   Cardiovascular:      Rate and Rhythm: Normal rate and regular rhythm.      Heart sounds: Normal heart sounds.   Pulmonary:      Effort: Pulmonary effort is normal.      Breath sounds: Normal breath sounds.   Musculoskeletal:      Cervical back: Neck supple.   Skin:     General: Skin is warm and dry.   Neurological:      Mental Status: He is alert.         Assessment/Plan   Problem List Items Addressed This Visit       Abnormal TSH    Relevant Orders    TSH with reflex to Free T4 if abnormal    Hyperlipidemia, mixed    Hypertension, essential, benign    Overweight with body mass index (BMI) of 26 to 26.9 in adult    Gastroesophageal reflux disease    Relevant Medications    pantoprazole (ProtoNix) 40 mg EC tablet     Other Visit Diagnoses       Well adult health check    -  Primary    Relevant Orders    Hepatitis C antibody    HIV 1/2 Antigen/Antibody Screen with Reflex to Confirmation    Sprain of right rotator cuff capsule, initial encounter        Relevant Orders    Referral to Physical Therapy    Rotator cuff " impingement syndrome, right        Relevant Orders    Referral to Physical Therapy          Encounter Diagnoses   Name Primary?    Well adult health check Yes    Gastroesophageal reflux disease, unspecified whether esophagitis present     Hypertension, essential, benign     Hyperlipidemia, mixed     Overweight with body mass index (BMI) of 26 to 26.9 in adult     Abnormal TSH     Sprain of right rotator cuff capsule, initial encounter     Rotator cuff impingement syndrome, right      Daniel Juan, DO

## 2024-05-27 DIAGNOSIS — I10 HYPERTENSION, ESSENTIAL, BENIGN: ICD-10-CM

## 2024-05-28 RX ORDER — LOSARTAN POTASSIUM 25 MG/1
TABLET ORAL
Qty: 90 TABLET | Refills: 3 | Status: SHIPPED | OUTPATIENT
Start: 2024-05-28

## 2024-10-04 ENCOUNTER — LAB (OUTPATIENT)
Dept: LAB | Facility: LAB | Age: 59
End: 2024-10-04
Payer: COMMERCIAL

## 2024-10-04 DIAGNOSIS — R79.89 ABNORMAL TSH: ICD-10-CM

## 2024-10-04 DIAGNOSIS — Z00.00 WELL ADULT HEALTH CHECK: ICD-10-CM

## 2024-10-04 LAB
HCV AB SER QL: NONREACTIVE
HIV 1+2 AB+HIV1 P24 AG SERPL QL IA: NONREACTIVE
T4 FREE SERPL-MCNC: 0.9 NG/DL (ref 0.61–1.12)
TSH SERPL-ACNC: 4.25 MIU/L (ref 0.44–3.98)

## 2024-10-04 PROCEDURE — 36415 COLL VENOUS BLD VENIPUNCTURE: CPT

## 2024-10-04 PROCEDURE — 87389 HIV-1 AG W/HIV-1&-2 AB AG IA: CPT

## 2024-10-04 PROCEDURE — 84443 ASSAY THYROID STIM HORMONE: CPT

## 2024-10-04 PROCEDURE — 84439 ASSAY OF FREE THYROXINE: CPT

## 2024-10-04 PROCEDURE — 86803 HEPATITIS C AB TEST: CPT

## 2024-10-22 ENCOUNTER — APPOINTMENT (OUTPATIENT)
Dept: CARDIOLOGY | Facility: HOSPITAL | Age: 59
End: 2024-10-22
Payer: COMMERCIAL

## 2024-10-22 ENCOUNTER — APPOINTMENT (OUTPATIENT)
Dept: RADIOLOGY | Facility: HOSPITAL | Age: 59
End: 2024-10-22
Payer: COMMERCIAL

## 2024-10-22 ENCOUNTER — HOSPITAL ENCOUNTER (EMERGENCY)
Facility: HOSPITAL | Age: 59
Discharge: HOME | End: 2024-10-22
Attending: STUDENT IN AN ORGANIZED HEALTH CARE EDUCATION/TRAINING PROGRAM
Payer: COMMERCIAL

## 2024-10-22 VITALS
OXYGEN SATURATION: 96 % | TEMPERATURE: 97 F | BODY MASS INDEX: 25.92 KG/M2 | HEIGHT: 69 IN | SYSTOLIC BLOOD PRESSURE: 132 MMHG | WEIGHT: 175 LBS | HEART RATE: 56 BPM | DIASTOLIC BLOOD PRESSURE: 78 MMHG | RESPIRATION RATE: 16 BRPM

## 2024-10-22 DIAGNOSIS — R07.9 CHEST PAIN, UNSPECIFIED TYPE: Primary | ICD-10-CM

## 2024-10-22 LAB
ALBUMIN SERPL BCP-MCNC: 4 G/DL (ref 3.4–5)
ALP SERPL-CCNC: 58 U/L (ref 33–120)
ALT SERPL W P-5'-P-CCNC: 25 U/L (ref 10–52)
ANION GAP SERPL CALC-SCNC: 13 MMOL/L (ref 10–20)
AST SERPL W P-5'-P-CCNC: 20 U/L (ref 9–39)
ATRIAL RATE: 62 BPM
ATRIAL RATE: 69 BPM
BASOPHILS # BLD AUTO: 0.04 X10*3/UL (ref 0–0.1)
BASOPHILS NFR BLD AUTO: 0.8 %
BILIRUB SERPL-MCNC: 0.4 MG/DL (ref 0–1.2)
BUN SERPL-MCNC: 14 MG/DL (ref 6–23)
CALCIUM SERPL-MCNC: 8.5 MG/DL (ref 8.6–10.3)
CARDIAC TROPONIN I PNL SERPL HS: 3 NG/L (ref 0–20)
CARDIAC TROPONIN I PNL SERPL HS: 4 NG/L (ref 0–20)
CHLORIDE SERPL-SCNC: 106 MMOL/L (ref 98–107)
CO2 SERPL-SCNC: 23 MMOL/L (ref 21–32)
CREAT SERPL-MCNC: 0.95 MG/DL (ref 0.5–1.3)
EGFRCR SERPLBLD CKD-EPI 2021: >90 ML/MIN/1.73M*2
EOSINOPHIL # BLD AUTO: 0.21 X10*3/UL (ref 0–0.7)
EOSINOPHIL NFR BLD AUTO: 4 %
ERYTHROCYTE [DISTWIDTH] IN BLOOD BY AUTOMATED COUNT: 13.1 % (ref 11.5–14.5)
GLUCOSE SERPL-MCNC: 227 MG/DL (ref 74–99)
HCT VFR BLD AUTO: 44.1 % (ref 41–52)
HGB BLD-MCNC: 15.7 G/DL (ref 13.5–17.5)
IMM GRANULOCYTES # BLD AUTO: 0.02 X10*3/UL (ref 0–0.7)
IMM GRANULOCYTES NFR BLD AUTO: 0.4 % (ref 0–0.9)
LYMPHOCYTES # BLD AUTO: 1.2 X10*3/UL (ref 1.2–4.8)
LYMPHOCYTES NFR BLD AUTO: 23.1 %
MAGNESIUM SERPL-MCNC: 1.89 MG/DL (ref 1.6–2.4)
MCH RBC QN AUTO: 30.7 PG (ref 26–34)
MCHC RBC AUTO-ENTMCNC: 35.6 G/DL (ref 32–36)
MCV RBC AUTO: 86 FL (ref 80–100)
MONOCYTES # BLD AUTO: 0.33 X10*3/UL (ref 0.1–1)
MONOCYTES NFR BLD AUTO: 6.4 %
NEUTROPHILS # BLD AUTO: 3.39 X10*3/UL (ref 1.2–7.7)
NEUTROPHILS NFR BLD AUTO: 65.3 %
NRBC BLD-RTO: 0 /100 WBCS (ref 0–0)
P AXIS: 59 DEGREES
P AXIS: 72 DEGREES
P OFFSET: 173 MS
P OFFSET: 187 MS
P ONSET: 122 MS
P ONSET: 136 MS
PLATELET # BLD AUTO: 205 X10*3/UL (ref 150–450)
POTASSIUM SERPL-SCNC: 3.5 MMOL/L (ref 3.5–5.3)
PR INTERVAL: 170 MS
PR INTERVAL: 182 MS
PROT SERPL-MCNC: 6.7 G/DL (ref 6.4–8.2)
Q ONSET: 207 MS
Q ONSET: 227 MS
QRS COUNT: 11 BEATS
QRS COUNT: 11 BEATS
QRS DURATION: 88 MS
QRS DURATION: 92 MS
QT INTERVAL: 406 MS
QT INTERVAL: 408 MS
QTC CALCULATION(BAZETT): 412 MS
QTC CALCULATION(BAZETT): 437 MS
QTC FREDERICIA: 410 MS
QTC FREDERICIA: 427 MS
R AXIS: -24 DEGREES
R AXIS: -76 DEGREES
RBC # BLD AUTO: 5.12 X10*6/UL (ref 4.5–5.9)
SODIUM SERPL-SCNC: 138 MMOL/L (ref 136–145)
T AXIS: 4 DEGREES
T AXIS: 62 DEGREES
T OFFSET: 411 MS
T OFFSET: 430 MS
VENTRICULAR RATE: 62 BPM
VENTRICULAR RATE: 69 BPM
WBC # BLD AUTO: 5.2 X10*3/UL (ref 4.4–11.3)

## 2024-10-22 PROCEDURE — 71045 X-RAY EXAM CHEST 1 VIEW: CPT

## 2024-10-22 PROCEDURE — 84484 ASSAY OF TROPONIN QUANT: CPT | Performed by: NURSE PRACTITIONER

## 2024-10-22 PROCEDURE — 99284 EMERGENCY DEPT VISIT MOD MDM: CPT | Mod: 25

## 2024-10-22 PROCEDURE — 93005 ELECTROCARDIOGRAM TRACING: CPT

## 2024-10-22 PROCEDURE — 36415 COLL VENOUS BLD VENIPUNCTURE: CPT | Performed by: NURSE PRACTITIONER

## 2024-10-22 PROCEDURE — 80053 COMPREHEN METABOLIC PANEL: CPT | Performed by: NURSE PRACTITIONER

## 2024-10-22 PROCEDURE — 85025 COMPLETE CBC W/AUTO DIFF WBC: CPT | Performed by: NURSE PRACTITIONER

## 2024-10-22 PROCEDURE — 2500000001 HC RX 250 WO HCPCS SELF ADMINISTERED DRUGS (ALT 637 FOR MEDICARE OP): Performed by: NURSE PRACTITIONER

## 2024-10-22 PROCEDURE — 71045 X-RAY EXAM CHEST 1 VIEW: CPT | Performed by: RADIOLOGY

## 2024-10-22 PROCEDURE — 83735 ASSAY OF MAGNESIUM: CPT | Performed by: NURSE PRACTITIONER

## 2024-10-22 RX ORDER — NAPROXEN SODIUM 220 MG/1
324 TABLET, FILM COATED ORAL ONCE
Status: COMPLETED | OUTPATIENT
Start: 2024-10-22 | End: 2024-10-22

## 2024-10-22 ASSESSMENT — LIFESTYLE VARIABLES
TOTAL SCORE: 0
HAVE PEOPLE ANNOYED YOU BY CRITICIZING YOUR DRINKING: NO
EVER FELT BAD OR GUILTY ABOUT YOUR DRINKING: NO
HAVE YOU EVER FELT YOU SHOULD CUT DOWN ON YOUR DRINKING: NO
EVER HAD A DRINK FIRST THING IN THE MORNING TO STEADY YOUR NERVES TO GET RID OF A HANGOVER: NO

## 2024-10-22 ASSESSMENT — HEART SCORE
ECG: NORMAL
HEART SCORE: 3
RISK FACTORS: >2 RISK FACTORS OR HX OF ATHEROSCLEROTIC DISEASE
AGE: 45-64
HISTORY: SLIGHTLY SUSPICIOUS
TROPONIN: LESS THAN OR EQUAL TO NORMAL LIMIT

## 2024-10-22 ASSESSMENT — PAIN DESCRIPTION - ORIENTATION: ORIENTATION: LEFT

## 2024-10-22 ASSESSMENT — COLUMBIA-SUICIDE SEVERITY RATING SCALE - C-SSRS
2. HAVE YOU ACTUALLY HAD ANY THOUGHTS OF KILLING YOURSELF?: NO
1. IN THE PAST MONTH, HAVE YOU WISHED YOU WERE DEAD OR WISHED YOU COULD GO TO SLEEP AND NOT WAKE UP?: NO
6. HAVE YOU EVER DONE ANYTHING, STARTED TO DO ANYTHING, OR PREPARED TO DO ANYTHING TO END YOUR LIFE?: NO

## 2024-10-22 ASSESSMENT — PAIN SCALES - GENERAL
PAINLEVEL_OUTOF10: 4
PAINLEVEL_OUTOF10: 7
PAINLEVEL_OUTOF10: 7

## 2024-10-22 ASSESSMENT — PAIN DESCRIPTION - DESCRIPTORS: DESCRIPTORS: DISCOMFORT

## 2024-10-22 ASSESSMENT — PAIN - FUNCTIONAL ASSESSMENT: PAIN_FUNCTIONAL_ASSESSMENT: 0-10

## 2024-10-22 ASSESSMENT — PAIN DESCRIPTION - LOCATION: LOCATION: CHEST

## 2024-10-22 ASSESSMENT — PAIN DESCRIPTION - PAIN TYPE: TYPE: ACUTE PAIN

## 2024-10-22 NOTE — ED PROVIDER NOTES
HPI   Chief Complaint   Patient presents with    Chest Pain     CP x 2 days that radiates to left arm and back, worsening this am. Describes as discomfort. No cardiac hx       59-year-old male presents emergency department, patient states started yesterday with some heaviness in his left upper chest, left arm, left shoulder.  States yesterday it was mostly in the left arm.  No associated shortness of breath, diaphoresis or nausea.  Patient states his dad has history of heart attacks, multiple, states the first 1 was at 48 years of age.  Patient has high blood pressure and elevated triglycerides, although cannot tolerate statins so not currently taking anything for this.    States last week was having episodes of feeling very dizzy and lightheaded, had a headache.      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    Bladder outlet obstruction    Contact with and (suspected) exposure to covid-19 12/04/2020    Suspected COVID-19 virus infection    Diverticulitis of colon 02/28/2023    Hydronephrosis with urinary obstruction due to renal calculus 02/28/2023    Hypertension     Personal history of other diseases of the digestive system 10/26/2020    History of diverticulitis of colon    Personal history of other diseases of urinary system 09/22/2022    History of urethral stricture    Personal history of other specified conditions 02/14/2020    History of snoring    Personal history of other specified conditions 02/01/2021    History of headache    Somnolence 02/14/2020    Daytime somnolence    Unspecified anterior urethral stricture, male     Stricture of anterior urethra in male, unspecified stricture type     Ureteral stone 02/28/2023     Past Surgical History:   Procedure Laterality Date    OTHER SURGICAL HISTORY  02/14/2020    Laparoscopic partial colectomy    OTHER SURGICAL HISTORY  02/14/2020    Appendectomy    OTHER SURGICAL HISTORY  02/14/2020    Urethroplasty    OTHER SURGICAL HISTORY  02/14/2020    Colectomy    OTHER SURGICAL HISTORY  02/14/2020    Arm surgery     Family History   Problem Relation Name Age of Onset    Other (aortic valve disorder) Mother      Dementia Mother      Glaucoma Father          acute angle-closure glaucoma    Diabetes Father      Hyperlipidemia Father       Social History     Tobacco Use    Smoking status: Never    Smokeless tobacco: Never   Vaping Use    Vaping status: Never Used   Substance Use Topics    Alcohol use: Not Currently    Drug use: Never       Physical Exam   ED Triage Vitals [10/22/24 0635]   Temperature Heart Rate Respirations BP   36.1 °C (97 °F) 70 18 (!) 165/91      Pulse Ox Temp Source Heart Rate Source Patient Position   98 % Temporal Monitor Sitting      BP Location FiO2 (%)     Right arm --       Physical Exam  Physical Exam:  Constitutional: Vitals noted, no distress. Afebrile.   Cardiovascular: Regular, rate, rhythm, no murmur.   Pulmonary: Lungs clear bilaterally with good aeration. No adventitious breath sounds.   Gastrointestinal: Soft, nonsurgical. Nontender. No peritoneal signs. Normoactive bowel sounds.   Musculoskeletal: No peripheral edema. Negative Homans bilaterally, no cords.   Skin: No rash.   Neuro: No focal neurologic deficits, NIH score of 0.      ED Course & MDM   Diagnoses as of 10/22/24 0947   Chest pain, unspecified type          Labs Reviewed   COMPREHENSIVE METABOLIC PANEL - Abnormal       Result Value    Glucose 227 (*)     Sodium 138      Potassium 3.5      Chloride 106      Bicarbonate 23      Anion Gap 13      Urea Nitrogen 14      Creatinine 0.95      eGFR >90      Calcium 8.5 (*)     Albumin 4.0      Alkaline Phosphatase 58      Total  Protein 6.7      AST 20      Bilirubin, Total 0.4      ALT 25     MAGNESIUM - Normal    Magnesium 1.89     SERIAL TROPONIN-INITIAL - Normal    Troponin I, High Sensitivity 4      Narrative:     Less than 99th percentile of normal range cutoff-  Female and children under 18 years old <14 ng/L; Male <21 ng/L: Negative  Repeat testing should be performed if clinically indicated.     Female and children under 18 years old 14-50 ng/L; Male 21-50 ng/L:  Consistent with possible cardiac damage and possible increased clinical   risk. Serial measurements may help to assess extent of myocardial damage.     >50 ng/L: Consistent with cardiac damage, increased clinical risk and  myocardial infarction. Serial measurements may help assess extent of   myocardial damage.      NOTE: Children less than 1 year old may have higher baseline troponin   levels and results should be interpreted in conjunction with the overall   clinical context.     NOTE: Troponin I testing is performed using a different   testing methodology at [facility] than at other   [facility]. Direct result comparisons should only   be made within the same method.   SERIAL TROPONIN, 1 HOUR - Normal    Troponin I, High Sensitivity 3      Narrative:     Less than 99th percentile of normal range cutoff-  Female and children under 18 years old <14 ng/L; Male <21 ng/L: Negative  Repeat testing should be performed if clinically indicated.     Female and children under 18 years old 14-50 ng/L; Male 21-50 ng/L:  Consistent with possible cardiac damage and possible increased clinical   risk. Serial measurements may help to assess extent of myocardial damage.     >50 ng/L: Consistent with cardiac damage, increased clinical risk and  myocardial infarction. Serial measurements may help assess extent of   myocardial damage.      NOTE: Children less than 1 year old may have higher baseline troponin   levels and results should be interpreted in conjunction with  the overall   clinical context.     NOTE: Troponin I testing is performed using a different   testing methodology at [facility] than at other   [facility] hospitals. Direct result comparisons should only   be made within the same method.   TROPONIN SERIES- (INITIAL, 1 HR)    Narrative:     The following orders were created for panel order Troponin I Series, High Sensitivity (0, 1 HR).  Procedure                               Abnormality         Status                     ---------                               -----------         ------                     Troponin I, High Sensiti...[[id number]]  Normal              Final result               Troponin, High Sensitivi...[[id number]]  Normal              Final result                 Please view results for these tests on the individual orders.   CBC WITH AUTO DIFFERENTIAL    WBC 5.2      nRBC 0.0      RBC 5.12      Hemoglobin 15.7      Hematocrit 44.1      MCV 86      MCH 30.7      MCHC 35.6      RDW 13.1      Platelets 205      Neutrophils % 65.3      Immature Granulocytes %, Automated 0.4      Lymphocytes % 23.1      Monocytes % 6.4      Eosinophils % 4.0      Basophils % 0.8      Neutrophils Absolute 3.39      Immature Granulocytes Absolute, Automated 0.02      Lymphocytes Absolute 1.20      Monocytes Absolute 0.33      Eosinophils Absolute 0.21      Basophils Absolute 0.04          XR chest 1 view   Final Result   No acute cardiopulmonary disease.        MACRO:   none        Signed by: Diane Nieto 10/22/2024 7:57 AM   Dictation workstation:   [id number]                 No data recorded     Nelda Coma Scale Score: 15 (10/22/24 0637 : Danette Griffin RN)                     Medical Decision Making    EKG at 650 with ventricular of 69, as interpreted by me, shows normal sinus rhythm with normal axis and intervals, unremarkable ST and T wave patterns, no evidence of acute ischemia or other acute findings.    Cardiac workup initiated, CBC and metabolic panels  unremarkable than mildly elevated glucose.  Troponin initially 4.  Chest x-ray unremarkable.    Repeat EKG at 823, with ventricular rate of 62, as interpreted by me, shows normal sinus rhythm with normal axis normal intervals, remarkable ST and T wave patterns, no evidence of acute ischemia or other acute findings.    Delta troponin 3.    Patient states last stress test was many years ago.  Has not followed with cardiology recently.  Discussed chest pain, seems atypical for chest pain, with his normal EKGs and troponins feel it is safe to discharge him home, although with very close cardiology follow-up.  Patient's pain is resolved.    Scheduled for cardiology follow-up, discussed return with any worsening symptoms or any additional concerns.      Procedure  Procedures     Otilia Thomas, LAUREN-ARRON  10/22/24 0970

## 2024-10-23 ENCOUNTER — APPOINTMENT (OUTPATIENT)
Dept: RADIOLOGY | Facility: HOSPITAL | Age: 59
End: 2024-10-23
Payer: COMMERCIAL

## 2024-10-23 ENCOUNTER — APPOINTMENT (OUTPATIENT)
Dept: CARDIOLOGY | Facility: HOSPITAL | Age: 59
End: 2024-10-23
Payer: COMMERCIAL

## 2024-10-23 ENCOUNTER — TELEPHONE (OUTPATIENT)
Dept: PRIMARY CARE | Facility: CLINIC | Age: 59
End: 2024-10-23
Payer: COMMERCIAL

## 2024-10-23 ENCOUNTER — HOSPITAL ENCOUNTER (EMERGENCY)
Facility: HOSPITAL | Age: 59
Discharge: HOME | End: 2024-10-23
Attending: STUDENT IN AN ORGANIZED HEALTH CARE EDUCATION/TRAINING PROGRAM
Payer: COMMERCIAL

## 2024-10-23 VITALS
SYSTOLIC BLOOD PRESSURE: 169 MMHG | TEMPERATURE: 96.8 F | HEIGHT: 69 IN | WEIGHT: 175 LBS | RESPIRATION RATE: 16 BRPM | DIASTOLIC BLOOD PRESSURE: 107 MMHG | OXYGEN SATURATION: 99 % | BODY MASS INDEX: 25.92 KG/M2 | HEART RATE: 72 BPM

## 2024-10-23 DIAGNOSIS — M79.622 PAIN IN LEFT UPPER ARM: ICD-10-CM

## 2024-10-23 DIAGNOSIS — R07.9 CHEST PAIN, UNSPECIFIED TYPE: Primary | ICD-10-CM

## 2024-10-23 LAB
ALBUMIN SERPL BCP-MCNC: 4.1 G/DL (ref 3.4–5)
ALP SERPL-CCNC: 53 U/L (ref 33–120)
ALT SERPL W P-5'-P-CCNC: 23 U/L (ref 10–52)
ANION GAP SERPL CALC-SCNC: 11 MMOL/L (ref 10–20)
AST SERPL W P-5'-P-CCNC: 16 U/L (ref 9–39)
ATRIAL RATE: 68 BPM
BASOPHILS # BLD AUTO: 0.04 X10*3/UL (ref 0–0.1)
BASOPHILS NFR BLD AUTO: 0.8 %
BILIRUB SERPL-MCNC: 0.6 MG/DL (ref 0–1.2)
BUN SERPL-MCNC: 11 MG/DL (ref 6–23)
CALCIUM SERPL-MCNC: 9 MG/DL (ref 8.6–10.3)
CARDIAC TROPONIN I PNL SERPL HS: 4 NG/L (ref 0–20)
CARDIAC TROPONIN I PNL SERPL HS: 4 NG/L (ref 0–20)
CHLORIDE SERPL-SCNC: 106 MMOL/L (ref 98–107)
CO2 SERPL-SCNC: 25 MMOL/L (ref 21–32)
CREAT SERPL-MCNC: 0.93 MG/DL (ref 0.5–1.3)
D DIMER PPP FEU-MCNC: 285 NG/ML FEU
EGFRCR SERPLBLD CKD-EPI 2021: >90 ML/MIN/1.73M*2
EOSINOPHIL # BLD AUTO: 0.18 X10*3/UL (ref 0–0.7)
EOSINOPHIL NFR BLD AUTO: 3.4 %
ERYTHROCYTE [DISTWIDTH] IN BLOOD BY AUTOMATED COUNT: 13.3 % (ref 11.5–14.5)
GLUCOSE SERPL-MCNC: 154 MG/DL (ref 74–99)
HCT VFR BLD AUTO: 47.1 % (ref 41–52)
HGB BLD-MCNC: 16.4 G/DL (ref 13.5–17.5)
IMM GRANULOCYTES # BLD AUTO: 0.08 X10*3/UL (ref 0–0.7)
IMM GRANULOCYTES NFR BLD AUTO: 1.5 % (ref 0–0.9)
LYMPHOCYTES # BLD AUTO: 1.36 X10*3/UL (ref 1.2–4.8)
LYMPHOCYTES NFR BLD AUTO: 26.1 %
MCH RBC QN AUTO: 30.3 PG (ref 26–34)
MCHC RBC AUTO-ENTMCNC: 34.8 G/DL (ref 32–36)
MCV RBC AUTO: 87 FL (ref 80–100)
MONOCYTES # BLD AUTO: 0.32 X10*3/UL (ref 0.1–1)
MONOCYTES NFR BLD AUTO: 6.1 %
NEUTROPHILS # BLD AUTO: 3.24 X10*3/UL (ref 1.2–7.7)
NEUTROPHILS NFR BLD AUTO: 62.1 %
NRBC BLD-RTO: 0 /100 WBCS (ref 0–0)
P AXIS: 68 DEGREES
P OFFSET: 178 MS
P ONSET: 124 MS
PLATELET # BLD AUTO: 215 X10*3/UL (ref 150–450)
POTASSIUM SERPL-SCNC: 3.4 MMOL/L (ref 3.5–5.3)
PR INTERVAL: 172 MS
PROT SERPL-MCNC: 6.9 G/DL (ref 6.4–8.2)
Q ONSET: 210 MS
QRS COUNT: 11 BEATS
QRS DURATION: 92 MS
QT INTERVAL: 378 MS
QTC CALCULATION(BAZETT): 401 MS
QTC FREDERICIA: 394 MS
R AXIS: -46 DEGREES
RBC # BLD AUTO: 5.42 X10*6/UL (ref 4.5–5.9)
SODIUM SERPL-SCNC: 139 MMOL/L (ref 136–145)
T AXIS: 43 DEGREES
T OFFSET: 399 MS
VENTRICULAR RATE: 68 BPM
WBC # BLD AUTO: 5.2 X10*3/UL (ref 4.4–11.3)

## 2024-10-23 PROCEDURE — 93971 EXTREMITY STUDY: CPT

## 2024-10-23 PROCEDURE — 71275 CT ANGIOGRAPHY CHEST: CPT | Performed by: RADIOLOGY

## 2024-10-23 PROCEDURE — 71045 X-RAY EXAM CHEST 1 VIEW: CPT

## 2024-10-23 PROCEDURE — 93005 ELECTROCARDIOGRAM TRACING: CPT

## 2024-10-23 PROCEDURE — 2500000001 HC RX 250 WO HCPCS SELF ADMINISTERED DRUGS (ALT 637 FOR MEDICARE OP): Performed by: STUDENT IN AN ORGANIZED HEALTH CARE EDUCATION/TRAINING PROGRAM

## 2024-10-23 PROCEDURE — 71275 CT ANGIOGRAPHY CHEST: CPT

## 2024-10-23 PROCEDURE — 2500000001 HC RX 250 WO HCPCS SELF ADMINISTERED DRUGS (ALT 637 FOR MEDICARE OP)

## 2024-10-23 PROCEDURE — 85025 COMPLETE CBC W/AUTO DIFF WBC: CPT | Performed by: EMERGENCY MEDICINE

## 2024-10-23 PROCEDURE — 80053 COMPREHEN METABOLIC PANEL: CPT | Performed by: EMERGENCY MEDICINE

## 2024-10-23 PROCEDURE — 99285 EMERGENCY DEPT VISIT HI MDM: CPT | Mod: 25

## 2024-10-23 PROCEDURE — 36415 COLL VENOUS BLD VENIPUNCTURE: CPT | Performed by: EMERGENCY MEDICINE

## 2024-10-23 PROCEDURE — 85379 FIBRIN DEGRADATION QUANT: CPT | Performed by: EMERGENCY MEDICINE

## 2024-10-23 PROCEDURE — 71045 X-RAY EXAM CHEST 1 VIEW: CPT | Performed by: RADIOLOGY

## 2024-10-23 PROCEDURE — 93971 EXTREMITY STUDY: CPT | Performed by: INTERNAL MEDICINE

## 2024-10-23 PROCEDURE — 74174 CTA ABD&PLVS W/CONTRAST: CPT | Performed by: RADIOLOGY

## 2024-10-23 PROCEDURE — 84484 ASSAY OF TROPONIN QUANT: CPT | Performed by: EMERGENCY MEDICINE

## 2024-10-23 PROCEDURE — 2550000001 HC RX 255 CONTRASTS: Performed by: STUDENT IN AN ORGANIZED HEALTH CARE EDUCATION/TRAINING PROGRAM

## 2024-10-23 RX ORDER — NAPROXEN SODIUM 220 MG/1
324 TABLET, FILM COATED ORAL ONCE
Status: COMPLETED | OUTPATIENT
Start: 2024-10-23 | End: 2024-10-23

## 2024-10-23 RX ORDER — ASPIRIN 325 MG
325 TABLET ORAL ONCE
Status: COMPLETED | OUTPATIENT
Start: 2024-10-23 | End: 2024-10-23

## 2024-10-23 ASSESSMENT — LIFESTYLE VARIABLES
EVER HAD A DRINK FIRST THING IN THE MORNING TO STEADY YOUR NERVES TO GET RID OF A HANGOVER: NO
HAVE YOU EVER FELT YOU SHOULD CUT DOWN ON YOUR DRINKING: NO
HAVE PEOPLE ANNOYED YOU BY CRITICIZING YOUR DRINKING: NO
TOTAL SCORE: 0
EVER FELT BAD OR GUILTY ABOUT YOUR DRINKING: NO

## 2024-10-23 ASSESSMENT — HEART SCORE
HISTORY: SLIGHTLY SUSPICIOUS
RISK FACTORS: >2 RISK FACTORS OR HX OF ATHEROSCLEROTIC DISEASE
ECG: NORMAL
AGE: 45-64
TROPONIN: LESS THAN OR EQUAL TO NORMAL LIMIT
HEART SCORE: 3

## 2024-10-23 ASSESSMENT — PAIN DESCRIPTION - DESCRIPTORS
DESCRIPTORS: ACHING
DESCRIPTORS: PRESSURE

## 2024-10-23 ASSESSMENT — PAIN DESCRIPTION - FREQUENCY: FREQUENCY: CONSTANT/CONTINUOUS

## 2024-10-23 ASSESSMENT — PAIN DESCRIPTION - LOCATION: LOCATION: CHEST

## 2024-10-23 ASSESSMENT — PAIN SCALES - GENERAL: PAINLEVEL_OUTOF10: 6

## 2024-10-23 ASSESSMENT — PAIN DESCRIPTION - ORIENTATION: ORIENTATION: LEFT

## 2024-10-23 ASSESSMENT — PAIN - FUNCTIONAL ASSESSMENT: PAIN_FUNCTIONAL_ASSESSMENT: 0-10

## 2024-10-23 NOTE — PROGRESS NOTES
No emergency Medicine Transition of Care Note.    I received Yeison Olivera in signout from Dr. Carl.  Please see the previous ED provider note for all HPI, PE and MDM up to the time of signout at 1900. This is in addition to the primary record.    In brief Yeison Olivera is an 59 y.o. male presenting for chest and arm pain.  Chief Complaint   Patient presents with    Arm Pain    Chest Pain     Chest pain and left arm pain for the past 2 days     At the time of signout we were awaiting: CT angio of the chest, abdomen, pelvis.    Diagnoses as of 10/23/24 2105   Chest pain, unspecified type       Medical Decision Making  Patient is a 59-year-old male who presented to the hospital for left arm and left upper chest pain.  Symptoms improve when he received aspirin but does eventually return.  The pain is mostly in his left arm.  He has had 2 negative troponins, and unremarkable CBC and CMP.  His chest x-ray is unremarkable.  His duplex ultrasound the left extremity is negative for DVT.    CT angio of the chest, abdomen, pelvis is unremarkable.  These findings were discussed with the patient.  Patient has heart score of 3.  He also presented to an outside ED yesterday for similar symptoms and had negative cardiac workup.  He is concerned about his heart because his father had a heart attack around his age.  Patient's symptoms are not consistent with acute coronary syndrome based on the workup and history.  The previous team had discussed this with Dr. Tiwari, cardiologist on-call.  Dr. Tiwari did not see any indication for admission for cardiac causes.  We did discuss this with the hospitalist as well, Dr. Real.  As we had already consulted with cardiology from the emergency department, patient has a heart score of 3, and his symptoms are likely to be related to acute coronary syndrome, Dr. Real felt that a cardiology consultation after admission would not be helpful or appropriate use of resources and that  further testing could be done outpatient.  This was discussed with the patient and his wife.  They are agreeable with discharge home and outpatient follow-up with a primary care provider as well as cardiology.  Home care and return instructions discussed. Patient expressed understanding and agreement. Patient discharged in stable condition.    Hayden Frederick DO, PGY-4  Emergency Medicine Resident        Final diagnoses:   [M79.622] Pain in left upper arm           Procedure  Procedures    Hayden Frederick DO

## 2024-10-23 NOTE — ED PROVIDER NOTES
EMERGENCY DEPARTMENT ENCOUNTER      Pt Name: Yeison Olivera  MRN: [id number]  Birthdate 1965  Date of evaluation: 10/23/2024    HISTORY OF PRESENT ILLNESS    Yeison Olivera is an 59 y.o. male with history including colon resection, urethral reconstruction, hyperlipidemia, hypertension, family history of heart attack Presenting to the emergency department for left-sided chest/shoulder pain and left arm pain as well as some back pain.  Patient was seen yesterday at [facility] with similar symptoms of heaviness in his left upper chest left arm and left shoulder.  This has been going on now for 3 days and states anxiety as his father had a heart attack roughly at this age.  Does also note some dizziness and headache.  Otherwise denies any shortness of breath, abdominal pain, urinary symptoms, constipation or diarrhea, nausea or vomiting, or any abnormal exertion related to his left arm.      PAST MEDICAL HISTORY     Past Medical History:   Diagnosis Date    Allergic contact dermatitis due to plants, except food 09/21/2020    Contact dermatitis due to poison ivy    Bladder outlet obstruction 02/28/2023    History of bladder outlet obstruction    Bladder-neck obstruction 03/15/2022    Bladder outlet obstruction    Contact with and (suspected) exposure to covid-19 12/04/2020    Suspected COVID-19 virus infection    Diverticulitis of colon 02/28/2023    Hydronephrosis with urinary obstruction due to renal calculus 02/28/2023    Hypertension     Personal history of other diseases of the digestive system 10/26/2020    History of diverticulitis of colon    Personal history of other diseases of urinary system 09/22/2022    History of urethral stricture    Personal history of other specified conditions 02/14/2020    History of snoring    Personal history of other specified conditions 02/01/2021    History of headache    Somnolence 02/14/2020    Daytime somnolence    Unspecified anterior urethral stricture, male     Stricture  of anterior urethra in male, unspecified stricture type    Ureteral stone 02/28/2023       SURGICAL HISTORY       Past Surgical History:   Procedure Laterality Date    OTHER SURGICAL HISTORY  02/14/2020    Laparoscopic partial colectomy    OTHER SURGICAL HISTORY  02/14/2020    Appendectomy    OTHER SURGICAL HISTORY  02/14/2020    Urethroplasty    OTHER SURGICAL HISTORY  02/14/2020    Colectomy    OTHER SURGICAL HISTORY  02/14/2020    Arm surgery       CURRENT MEDICATIONS       Discharge Medication List as of 10/23/2024  9:37 PM        CONTINUE these medications which have NOT CHANGED    Details   APPLE CIDER VINEGAR ORAL Take 1 capsule by mouth once daily at bedtime., Historical Med      ascorbic acid (Vitamin C) 500 mg tablet Take 1 tablet (500 mg) by mouth once daily at bedtime., Historical Med      cholecalciferol (Vitamin D-3) 5,000 Units tablet Take 1 tablet (5,000 Units) by mouth once daily at bedtime., Historical Med      ezetimibe (Zetia) 10 mg tablet TAKE ONE TABLET BY MOUTH EVERY DAY, Starting Fri 1/5/2024, Normal      fish oil concentrate (Omega-3) 120-180 mg capsule Take 1 capsule (1 g) by mouth once daily at bedtime., Starting Tue 3/15/2022, Historical Med      garlic 1,000 mg capsule Take 1 capsule by mouth once daily at bedtime., Historical Med      losartan (Cozaar) 25 mg tablet TAKE 1 TABLET DAILY, Normal      pantoprazole (ProtoNix) 40 mg EC tablet Take 1 tablet (40 mg) by mouth once daily. Do not crush, chew, or split., Starting Tue 5/7/2024, Until Wed 5/7/2025, Normal      vit E acet/selenium (VITAMIN E ACETATE-SELENIUM ORAL) Take 1 capsule by mouth once daily at bedtime. Vitamin E/Selenium 400-50 UNIT-MCG CAPS, Historical Med      vitamin-B complex split tablet Take 1 tablet (2 half tablet) by mouth once daily at bedtime. Super Stress B-Complex CR TBCR, Historical Med             ALLERGIES     Patient has no known allergies.    FAMILY HISTORY       Family History   Problem Relation Name Age of  Onset    Other (aortic valve disorder) Mother      Dementia Mother      Glaucoma Father          acute angle-closure glaucoma    Diabetes Father      Hyperlipidemia Father          SOCIAL HISTORY       Social History     Socioeconomic History    Marital status:    Tobacco Use    Smoking status: Never    Smokeless tobacco: Never   Vaping Use    Vaping status: Never Used   Substance and Sexual Activity    Alcohol use: Not Currently    Drug use: Never    Sexual activity: Defer     Social Drivers of Health     Financial Resource Strain: Not on File (2020)    Received from [facility]    Financial Resource Strain     Financial Resource Strain: 0   Food Insecurity: Not on File (2020)    Received from [facility]    Food Insecurity     Food: 0   Transportation Needs: Not on File (2020)    Received from [facility]    Transportation Needs     Transportation: 0   Physical Activity: Not on File (2020)    Received from [facility]    Physical Activity     Physical Activity: 0   Stress: Not on File (2020)    Received from [facility]    Stress     Stress: 0   Social Connections: Not on File (2020)    Received from [facility]    Social Connections     Social Connections and Isolation: 0   Housing Stability: Not on File (2020)    Received from [facility]    Housing Stability     Housin       PHYSICAL EXAM       ED Triage Vitals [10/23/24 1151]   Temperature Heart Rate Respirations BP   36 °C (96.8 °F) 82 16 134/71      Pulse Ox Temp src Heart Rate Source Patient Position   97 % -- -- --      BP Location FiO2 (%)     -- --       Physical Exam  HENT:      Head: Normocephalic and atraumatic.   Cardiovascular:      Rate and Rhythm: Normal rate and regular rhythm.      Heart sounds: Normal heart sounds.   Pulmonary:      Effort: Pulmonary effort is normal.      Breath sounds: Normal breath sounds.   Chest:      Chest wall: No tenderness.   Abdominal:      General: Bowel sounds are  normal.      Palpations: Abdomen is soft.   Musculoskeletal:         General: Normal range of motion.      Right shoulder: Normal.      Left shoulder: Normal.      Right upper arm: Normal.      Left upper arm: Tenderness present. No swelling.      Right forearm: Normal.      Left forearm: Tenderness present. No swelling.   Skin:     General: Skin is warm.      Capillary Refill: Capillary refill takes less than 2 seconds.   Neurological:      Mental Status: He is alert.          DIAGNOSTIC RESULTS     LABS:  Labs Reviewed   CBC WITH AUTO DIFFERENTIAL - Abnormal       Result Value    WBC 5.2      nRBC 0.0      RBC 5.42      Hemoglobin 16.4      Hematocrit 47.1      MCV 87      MCH 30.3      MCHC 34.8      RDW 13.3      Platelets 215      Neutrophils % 62.1      Immature Granulocytes %, Automated 1.5 (*)     Lymphocytes % 26.1      Monocytes % 6.1      Eosinophils % 3.4      Basophils % 0.8      Neutrophils Absolute 3.24      Immature Granulocytes Absolute, Automated 0.08      Lymphocytes Absolute 1.36      Monocytes Absolute 0.32      Eosinophils Absolute 0.18      Basophils Absolute 0.04     COMPREHENSIVE METABOLIC PANEL - Abnormal    Glucose 154 (*)     Sodium 139      Potassium 3.4 (*)     Chloride 106      Bicarbonate 25      Anion Gap 11      Urea Nitrogen 11      Creatinine 0.93      eGFR >90      Calcium 9.0      Albumin 4.1      Alkaline Phosphatase 53      Total Protein 6.9      AST 16      Bilirubin, Total 0.6      ALT 23     D-DIMER, NON VTE - Normal    D-Dimer Non VTE, Quant (ng/mL FEU) 285      Narrative:     The D-Dimer assay is reported in ng/mL Fibrinogen Equivalent Units (FEU). The results of this assay should NOT be used for the exclusion of Deep Vein Thrombosis and/or Pulmonary Embolism.   SERIAL TROPONIN-INITIAL - Normal    Troponin I, High Sensitivity 4      Narrative:     Less than 99th percentile of normal range cutoff-  Female and children under 18 years old <14 ng/L; Male <21 ng/L:  Negative  Repeat testing should be performed if clinically indicated.     Female and children under 18 years old 14-50 ng/L; Male 21-50 ng/L:  Consistent with possible cardiac damage and possible increased clinical   risk. Serial measurements may help to assess extent of myocardial damage.     >50 ng/L: Consistent with cardiac damage, increased clinical risk and  myocardial infarction. Serial measurements may help assess extent of   myocardial damage.      NOTE: Children less than 1 year old may have higher baseline troponin   levels and results should be interpreted in conjunction with the overall   clinical context.     NOTE: Troponin I testing is performed using a different   testing methodology at [facility] than at other   [facility]. Direct result comparisons should only   be made within the same method.   SERIAL TROPONIN, 1 HOUR - Normal    Troponin I, High Sensitivity 4      Narrative:     Less than 99th percentile of normal range cutoff-  Female and children under 18 years old <14 ng/L; Male <21 ng/L: Negative  Repeat testing should be performed if clinically indicated.     Female and children under 18 years old 14-50 ng/L; Male 21-50 ng/L:  Consistent with possible cardiac damage and possible increased clinical   risk. Serial measurements may help to assess extent of myocardial damage.     >50 ng/L: Consistent with cardiac damage, increased clinical risk and  myocardial infarction. Serial measurements may help assess extent of   myocardial damage.      NOTE: Children less than 1 year old may have higher baseline troponin   levels and results should be interpreted in conjunction with the overall   clinical context.     NOTE: Troponin I testing is performed using a different   testing methodology at [facility] than at other   [facility]. Direct result comparisons should only   be made within the same method.   TROPONIN SERIES- (INITIAL, 1 HR)    Narrative:     The  following orders were created for panel order Troponin I Series, High Sensitivity (0, 1 HR).  Procedure                               Abnormality         Status                     ---------                               -----------         ------                     Troponin I, High Sensiti...[[id number]]  Normal              Final result               Troponin, High Sensitivi...[[id number]]  Normal              Final result                 Please view results for these tests on the individual orders.       All other labs were within normal range or not returned as of this dictation.    Imaging  CT angio chest abdomen pelvis   Final Result   No evidence of aortic aneurysm or dissection.        No airspace consolidation or pleural effusion.        No evidence of acute abnormality of the abdominal viscera.        MACRO:   None        Signed by: Yonas Lehman 10/23/2024 7:12 PM   Dictation workstation:   [id number]      Vascular US upper extremity venous duplex left   Final Result      XR chest 1 view   Final Result   1.  No evidence of acute cardiopulmonary process.                  MACRO:   None        Signed by: Carloz Odonnell 10/23/2024 1:12 PM   Dictation workstation:   [id number]           Procedures  Procedures     EMERGENCY DEPARTMENT COURSE/MDM:   Medical Decision Making  The patient is a 59-year-old male presenting to the emergency department due to 3 days of left upper chest, shoulder, upper arm, and forearm pain.  Patient was seen yesterday at [facility] and discharged after a cardiac workup was negative.  Cardiac workup to be repeated today with troponins, CBC, CMP, chest x-ray, EKG as well as a D-dimer.  Workup again negative.  Patient counseled that he is appropriate for discharge at this time however him and his wife are very persistent and that they would like admission for further cardiac workup.  Hospitalist Dr. Doan as well as Cardiologist Dr. Purdy are made aware of this patient's preferences  and were not amendable to admission for this patient.  Patient remained in the emergency room pending a ultrasound of the left upper extremity as well as a CT angio of the chest abdomen and pelvis which revealed no acute pathology.  Although there was significant attempts to admit this patient with a heart score of 3 and desire to get further cardiac workup, patient likely to be discharged as admitting teams are unwilling to take this patient.  Diagnoses as of 10/23/24 2244   Chest pain, unspecified type        External records reviewed: recent inpatient, clinic, and prior ED notes  Labs and Diagnostic imaging independently reviewed/interpreted by me.    Patient plan, care, lab results and imaging were all discussed with attending.    ED Medications administered this visit:    Medications   aspirin tablet 325 mg (325 mg oral Given 10/23/24 1427)   iohexol (OMNIPaque) 350 mg iodine/mL solution 90 mL (90 mL intravenous Given 10/23/24 1815)   aspirin chewable tablet 324 mg (324 mg oral Given 10/23/24 2134)     New Prescriptions from this visit:    Discharge Medication List as of 10/23/2024  9:37 PM          (Please note that portions of this note were completed with a voice recognition program.  Efforts were made to edit the dictations but occasionally words are mis-transcribed.)       Harlan Carl MD  Resident  10/23/24 2078

## 2024-10-23 NOTE — PROGRESS NOTES
This RN CM spoke with Dr. Juan, updated him on Desean's symptoms and recommendations to go to the ER. He agreed. Reviewed his ER visit yesterday with Dr. Juan.     Dr. Juan asked that this RN CM call report to the ER. Spoke to Laxmi at [facility] ER and provided brief background. They are aware that wife is driving patient from instead of the squad as they do not want to go back to [facility].

## 2024-10-23 NOTE — TELEPHONE ENCOUNTER
Geena, the patient's wife called seeking advice.    Yeison was at the ER in [place] 10/22/24 from about 6:00-11:00 am.  He went in due to radiating/shooting pains in the left arm/back/shoulder area.    He had 2 EKG's and blood work.  During the visit he was given 6 aspirin which alleviated some of the discomfort.    Geena is concerned because the shooting pains are continuing and Yeison's father had a heart attack and a quadruple bi-pass at the age of 58.    Yeison has taken aspirin since returning home which continues to provide relief.    They would like to know if they should return to [place] or if it is okay to drive to [facility] as that is where there would prefer.  (If a squad is called they will take them to [place])  or if a heart catheterization could just be ordered.    An appointment was scheduled with Manish Betancur, cardiologist for next Wednesday 10/30/24 before they left the ER    The nurse care manager was available to provide immediate support.  She advised the squad be called so Yeison could be monitored.  He could always be transferred to [facility] if that is the preference.    Geena informed the nurse care manager she was going to drive Yeison to [facility].

## 2024-10-24 ENCOUNTER — OFFICE VISIT (OUTPATIENT)
Dept: PRIMARY CARE | Facility: CLINIC | Age: 59
End: 2024-10-24
Payer: COMMERCIAL

## 2024-10-24 ENCOUNTER — HOSPITAL ENCOUNTER (OUTPATIENT)
Dept: RADIOLOGY | Facility: CLINIC | Age: 59
Discharge: HOME | End: 2024-10-24
Payer: COMMERCIAL

## 2024-10-24 VITALS
DIASTOLIC BLOOD PRESSURE: 96 MMHG | WEIGHT: 172.4 LBS | HEART RATE: 77 BPM | RESPIRATION RATE: 16 BRPM | SYSTOLIC BLOOD PRESSURE: 148 MMHG | HEIGHT: 69 IN | OXYGEN SATURATION: 98 % | BODY MASS INDEX: 25.53 KG/M2 | TEMPERATURE: 97.6 F

## 2024-10-24 DIAGNOSIS — I10 HYPERTENSION, UNSPECIFIED TYPE: ICD-10-CM

## 2024-10-24 DIAGNOSIS — E53.8 CYANOCOBALAMIN DEFICIENCY: ICD-10-CM

## 2024-10-24 DIAGNOSIS — R07.89 ATYPICAL CHEST PAIN: Primary | ICD-10-CM

## 2024-10-24 DIAGNOSIS — M47.22 CERVICAL RADICULOPATHY DUE TO DEGENERATIVE JOINT DISEASE OF SPINE: ICD-10-CM

## 2024-10-24 DIAGNOSIS — I10 ACCELERATED HYPERTENSION: ICD-10-CM

## 2024-10-24 DIAGNOSIS — I10 HYPERTENSION, ESSENTIAL, BENIGN: ICD-10-CM

## 2024-10-24 DIAGNOSIS — E78.2 HYPERLIPIDEMIA, MIXED: ICD-10-CM

## 2024-10-24 LAB
ATRIAL RATE: 74 BPM
P AXIS: 59 DEGREES
P OFFSET: 180 MS
P ONSET: 125 MS
PR INTERVAL: 166 MS
Q ONSET: 208 MS
QRS COUNT: 13 BEATS
QRS DURATION: 94 MS
QT INTERVAL: 380 MS
QTC CALCULATION(BAZETT): 421 MS
QTC FREDERICIA: 407 MS
R AXIS: -64 DEGREES
T AXIS: 51 DEGREES
T OFFSET: 398 MS
VENTRICULAR RATE: 74 BPM

## 2024-10-24 PROCEDURE — 99215 OFFICE O/P EST HI 40 MIN: CPT | Performed by: INTERNAL MEDICINE

## 2024-10-24 PROCEDURE — 3008F BODY MASS INDEX DOCD: CPT | Performed by: INTERNAL MEDICINE

## 2024-10-24 PROCEDURE — 3077F SYST BP >= 140 MM HG: CPT | Performed by: INTERNAL MEDICINE

## 2024-10-24 PROCEDURE — 3080F DIAST BP >= 90 MM HG: CPT | Performed by: INTERNAL MEDICINE

## 2024-10-24 PROCEDURE — 72050 X-RAY EXAM NECK SPINE 4/5VWS: CPT

## 2024-10-24 RX ORDER — AMLODIPINE BESYLATE 5 MG/1
5 TABLET ORAL DAILY
Qty: 30 TABLET | Refills: 5 | Status: SHIPPED | OUTPATIENT
Start: 2024-10-24 | End: 2025-04-22

## 2024-10-24 RX ORDER — METHYLPREDNISOLONE 4 MG/1
TABLET ORAL
Qty: 21 TABLET | Refills: 0 | Status: SHIPPED | OUTPATIENT
Start: 2024-10-24 | End: 2024-10-31

## 2024-10-24 RX ORDER — CYANOCOBALAMIN 1000 UG/ML
1000 INJECTION, SOLUTION INTRAMUSCULAR; SUBCUTANEOUS ONCE
Status: SHIPPED | OUTPATIENT
Start: 2024-10-24

## 2024-10-24 RX ORDER — METOPROLOL TARTRATE 25 MG/1
12.5 TABLET, FILM COATED ORAL 2 TIMES DAILY
Qty: 30 TABLET | Refills: 5 | Status: SHIPPED | OUTPATIENT
Start: 2024-10-24 | End: 2025-04-22

## 2024-10-24 NOTE — DISCHARGE INSTRUCTIONS
Please follow-up with your primary care doctor in 1 to 2 days.  Please also call the cardiologist to make an appointment.  If you have change in symptoms or questions concerns, please return to the emergency department.

## 2024-10-25 ENCOUNTER — OFFICE VISIT (OUTPATIENT)
Dept: PRIMARY CARE | Facility: CLINIC | Age: 59
End: 2024-10-25
Payer: COMMERCIAL

## 2024-10-25 VITALS — DIASTOLIC BLOOD PRESSURE: 68 MMHG | SYSTOLIC BLOOD PRESSURE: 120 MMHG

## 2024-10-25 DIAGNOSIS — I10 HYPERTENSION, ESSENTIAL, BENIGN: ICD-10-CM

## 2024-10-25 DIAGNOSIS — Z00.00 WELL ADULT HEALTH CHECK: Primary | ICD-10-CM

## 2024-10-25 DIAGNOSIS — E78.2 HYPERLIPIDEMIA, MIXED: ICD-10-CM

## 2024-10-25 DIAGNOSIS — M54.12 CERVICAL RADICULOPATHY: ICD-10-CM

## 2024-10-25 RX ORDER — LOSARTAN POTASSIUM 25 MG/1
25 TABLET ORAL DAILY
Qty: 90 TABLET | Refills: 3 | Status: SHIPPED | OUTPATIENT
Start: 2024-10-25

## 2024-10-25 ASSESSMENT — ENCOUNTER SYMPTOMS
DIARRHEA: 0
FREQUENCY: 0
HEADACHES: 0
DIZZINESS: 0
VOMITING: 0
PALPITATIONS: 0
CONSTIPATION: 0
NAUSEA: 0
SHORTNESS OF BREATH: 0
ARTHRALGIAS: 0
NECK PAIN: 0
COUGH: 0
CHEST TIGHTNESS: 0
DYSPHORIC MOOD: 0
UNEXPECTED WEIGHT CHANGE: 0
FEVER: 0
POLYDIPSIA: 0

## 2024-10-25 NOTE — PROGRESS NOTES
Subjective   Yeison Olivera is a 59 y.o. male who presents for Nurse Visit (NV- blood pressure check ) and Earache.      Patient checking BP  at  home sometimes   ,no headaches,  no dizziness   Wife in the room with patient  today -Geena  Patient advised to start his steroid today and to see his cardiologist as scheduled for next week     Patient complains of new onset pain in the right ear and sore throat that started this morning.     Earache   There is pain in the right ear. This is a new problem. The current episode started today. The problem occurs constantly. The problem has been unchanged. There has been no fever. The pain is mild. He has tried nothing for the symptoms.       Review of Systems   HENT:  Positive for ear pain.        Objective   /68       Physical Exam  Constitutional:       Appearance: Normal appearance.   HENT:      Head: Normocephalic.   Cardiovascular:      Rate and Rhythm: Normal rate and regular rhythm.      Heart sounds: Normal heart sounds.   Pulmonary:      Effort: Pulmonary effort is normal.      Breath sounds: Normal breath sounds.   Musculoskeletal:      Cervical back: Neck supple.   Skin:     General: Skin is warm and dry.   Psychiatric:         Mood and Affect: Mood normal.         Assessment & Plan  Well adult health check    Orders:    CT cardiac scoring wo IV contrast; Future    Referral to Cardiology; Future    Hypertension, essential, benign         Hyperlipidemia, mixed         Cervical radiculopathy              Daniel Juan, DO

## 2024-10-25 NOTE — ASSESSMENT & PLAN NOTE
Orders:    amLODIPine (Norvasc) 5 mg tablet; Take 1 tablet (5 mg) by mouth once daily.    metoprolol tartrate (Lopressor) 25 mg tablet; Take 0.5 tablets (12.5 mg) by mouth 2 times a day.    losartan (Cozaar) 25 mg tablet; Take 1 tablet (25 mg) by mouth once daily.

## 2024-10-29 ENCOUNTER — TELEPHONE (OUTPATIENT)
Dept: PRIMARY CARE | Facility: CLINIC | Age: 59
End: 2024-10-29
Payer: COMMERCIAL

## 2024-10-30 ENCOUNTER — APPOINTMENT (OUTPATIENT)
Dept: CARDIOLOGY | Facility: CLINIC | Age: 59
End: 2024-10-30
Payer: COMMERCIAL

## 2024-11-02 LAB
ATRIAL RATE: 62 BPM
ATRIAL RATE: 68 BPM
ATRIAL RATE: 69 BPM
ATRIAL RATE: 74 BPM
P AXIS: 59 DEGREES
P AXIS: 59 DEGREES
P AXIS: 68 DEGREES
P AXIS: 72 DEGREES
P OFFSET: 173 MS
P OFFSET: 178 MS
P OFFSET: 180 MS
P OFFSET: 187 MS
P ONSET: 122 MS
P ONSET: 124 MS
P ONSET: 125 MS
P ONSET: 136 MS
PR INTERVAL: 166 MS
PR INTERVAL: 170 MS
PR INTERVAL: 172 MS
PR INTERVAL: 182 MS
Q ONSET: 207 MS
Q ONSET: 208 MS
Q ONSET: 210 MS
Q ONSET: 227 MS
QRS COUNT: 11 BEATS
QRS COUNT: 13 BEATS
QRS DURATION: 88 MS
QRS DURATION: 92 MS
QRS DURATION: 92 MS
QRS DURATION: 94 MS
QT INTERVAL: 378 MS
QT INTERVAL: 380 MS
QT INTERVAL: 406 MS
QT INTERVAL: 408 MS
QTC CALCULATION(BAZETT): 401 MS
QTC CALCULATION(BAZETT): 412 MS
QTC CALCULATION(BAZETT): 421 MS
QTC CALCULATION(BAZETT): 437 MS
QTC FREDERICIA: 394 MS
QTC FREDERICIA: 407 MS
QTC FREDERICIA: 410 MS
QTC FREDERICIA: 427 MS
R AXIS: -24 DEGREES
R AXIS: -46 DEGREES
R AXIS: -64 DEGREES
R AXIS: -76 DEGREES
T AXIS: 4 DEGREES
T AXIS: 43 DEGREES
T AXIS: 51 DEGREES
T AXIS: 62 DEGREES
T OFFSET: 398 MS
T OFFSET: 399 MS
T OFFSET: 411 MS
T OFFSET: 430 MS
VENTRICULAR RATE: 62 BPM
VENTRICULAR RATE: 68 BPM
VENTRICULAR RATE: 69 BPM
VENTRICULAR RATE: 74 BPM

## 2024-11-06 ENCOUNTER — TELEPHONE (OUTPATIENT)
Dept: PRIMARY CARE | Facility: CLINIC | Age: 59
End: 2024-11-06

## 2024-11-06 NOTE — TELEPHONE ENCOUNTER
I received a vm from [facility], order [id number]. I tried calling them back 2x and they wanted me to leave a message.

## 2024-11-07 ENCOUNTER — APPOINTMENT (OUTPATIENT)
Dept: CARDIOLOGY | Facility: CLINIC | Age: 59
End: 2024-11-07
Payer: COMMERCIAL

## 2024-11-07 VITALS
DIASTOLIC BLOOD PRESSURE: 84 MMHG | SYSTOLIC BLOOD PRESSURE: 126 MMHG | BODY MASS INDEX: 25.62 KG/M2 | HEART RATE: 58 BPM | HEIGHT: 69 IN | WEIGHT: 173 LBS

## 2024-11-07 DIAGNOSIS — E78.5 DYSLIPIDEMIA: Primary | ICD-10-CM

## 2024-11-07 DIAGNOSIS — I10 HYPERTENSION, ESSENTIAL, BENIGN: ICD-10-CM

## 2024-11-07 DIAGNOSIS — Z00.00 WELL ADULT HEALTH CHECK: ICD-10-CM

## 2024-11-07 PROCEDURE — 3008F BODY MASS INDEX DOCD: CPT | Performed by: INTERNAL MEDICINE

## 2024-11-07 PROCEDURE — 99204 OFFICE O/P NEW MOD 45 MIN: CPT | Performed by: INTERNAL MEDICINE

## 2024-11-07 PROCEDURE — 93000 ELECTROCARDIOGRAM COMPLETE: CPT | Performed by: INTERNAL MEDICINE

## 2024-11-07 PROCEDURE — 3074F SYST BP LT 130 MM HG: CPT | Performed by: INTERNAL MEDICINE

## 2024-11-07 PROCEDURE — 3079F DIAST BP 80-89 MM HG: CPT | Performed by: INTERNAL MEDICINE

## 2024-11-07 PROCEDURE — 1036F TOBACCO NON-USER: CPT | Performed by: INTERNAL MEDICINE

## 2024-11-07 NOTE — PATIENT INSTRUCTIONS
"It was my pleasure to meet you.  I look forward to being your cardiologist.  I am a huge believer in communicating with my patients.  Please contact me at any time, if anything is not clear to you regarding anything we have discussed, or if new questions occur to you.     You should increase your intake of fresh fruits and vegetables.  Try to consume 9-12 servings per day of such foods.  You should increase your intake of deep sea fish such as salmon and tuna.  Try to get two servings per week of fish, but if you are a pregnant woman, talk to your obstetrician before increasing your fish intake.  You should increase your intake of unprocessed nuts such as walnuts or almonds.  Increase your intake of plant-based protein.  You should avoid fried foods.  Don't consume sugary or starchy foods and sugary drinks.  Avoid saturated fats.  Try not to dine at restaurants more than once per month, and don't dine at fast food places.  Try to get 7-9 hours of sleep every night.  Try to get 150 minutes per week of moderate intensity exercise (after I have cleared you to start an exercise program).  Try to maintain the appropriate weight for your height based on body mass index (BMI). Maintain your cholesterol, blood sugar, and blood pressure in the recommended respective normal ranges.  There is a wealth of information on the American Heart Association's website regarding this.  Just Google \"Life's Essential 8\" for more information.   Ask me about any of these details  if you have questions.    As your cardiologist, I will be available to you at any time to answer any question you have concerning your heart health.  My staff, Tona can also answer any questions you may have.  Best of luck.       It is important for us to have an accurate list of the medications, supplements, and their doses.  It is also important for us to have an accurate list of your allergies.  Please bring this information to every appointment.  " This is a vital part of the quality of care you receive through all of your providers.

## 2024-11-07 NOTE — PROGRESS NOTES
Referred by Dr. Juan for Please see below.     History Of Present Illness:    Yeison Olivera is a 59 y.o. male presenting with hypertension.    I am seeing this 59-year-old hypertensive, hyperlipidemic man with a family history of CAD (father sustained an MI at 57) at the request of [facility] because of hypertension.    In October 2024, he went to the ER with arm pain.  He had labs, EKGs, and radiobraphic studies and was released.  He went to a different emergency room again the following day again with left arm pain.  He again had a battery of labs, EKGs, and radiographic studies done, and was again released.  He followed up with his PCP and radiographic studies of his neck were done, disclosing cervical spondylosis.  Steroids were prescribed, and his left arm pain has resolved.       The patient denies angina, dyspnea, palpitations, orthopnea, PND, syncope, and near syncope.    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   He can climb up multiple flights of stairs without cardiac symptoms.  He can walk at least 1.5 miles without  cardiac complaints.      His BP at home ranges from 120-150/70s.        Past Medical History:  He has a past medical history of Allergic contact dermatitis due to plants, except food (09/21/2020), Bladder outlet obstruction (02/28/2023), Bladder-neck obstruction (03/15/2022), Contact with and (suspected) exposure to covid-19 (12/04/2020), Diverticulitis of colon (02/28/2023), Hydronephrosis with urinary obstruction due to renal calculus (02/28/2023), Hypertension, Personal history of other diseases of the digestive system (10/26/2020), Personal history of other diseases of urinary system (09/22/2022), Personal history of other specified conditions (02/14/2020), Personal history of other specified conditions (02/01/2021), Somnolence (02/14/2020), Unspecified anterior urethral stricture, male, and Ureteral stone (02/28/2023).    Past Surgical History:  He has a past  "surgical history that includes Other surgical history (02/14/2020); Other surgical history (02/14/2020); Other surgical history (02/14/2020); Other surgical history (02/14/2020); and Other surgical history (02/14/2020).      Social History:  He reports that he has never smoked. He has never used smokeless tobacco. He reports that he does not currently use alcohol. He reports that he does not use drugs.    Family History:  Family History   Problem Relation Name Age of Onset    Other (aortic valve disorder) Mother      Dementia Mother      Glaucoma Father          acute angle-closure glaucoma    Diabetes Father      Hyperlipidemia Father      Other (CABG) Father      Other (heart issues) Maternal Grandfather      Other (heart issues.) Paternal Grandmother          Allergies:  Patient has no known allergies.    Outpatient Medications:  Current Outpatient Medications   Medication Instructions    amLODIPine (NORVASC) 5 mg, oral, Daily    APPLE CIDER VINEGAR ORAL 1 capsule, Nightly    ascorbic acid (Vitamin C) 500 mg tablet 1 tablet, Nightly    cholecalciferol (Vitamin D-3) 5,000 Units tablet 1 tablet, Nightly    ezetimibe (ZETIA) 10 mg, oral, Daily    fish oil concentrate (Omega-3) 120-180 mg capsule 1 capsule, Nightly    garlic 1,000 mg capsule 1 capsule, Nightly    losartan (COZAAR) 25 mg, oral, Daily    metoprolol tartrate (LOPRESSOR) 12.5 mg, oral, 2 times daily    pantoprazole (PROTONIX) 40 mg, oral, Daily, Do not crush, chew, or split.    vit E acet/selenium (VITAMIN E ACETATE-SELENIUM ORAL) 1 capsule, Nightly    vitamin-B complex split tablet 1 tablet, Nightly        Last Recorded Vitals:  Vitals:    11/07/24 1100   BP: 126/84   BP Location: Left arm   Patient Position: Sitting   Pulse: 58   Weight: 78.5 kg (173 lb)   Height: 1.753 m (5' 9\")       Physical Exam:  GENERAL:  pleasant 59 year-old  HEENT: No xanthelasma  NECK: Supple, no palpable adenopathy or thyromegaly  CHEST: Clear to auscultation, respiratory " effort unlabored  CARDIAC: RRR, normal S1 and S2, no audible murmur, rub, gallop, carotids are brisk, PMI is not displaced  ABD: Active bowel sounds, nontender, no organomegaly, no evidence of ascites  EXT: No clubbing, cyanosis, edema, or tenderness  NEURO: Awake, alert, appropriate, speech is fluent         Last Labs:  CBC -  Lab Results   Component Value Date    WBC 5.2 10/23/2024    HGB 16.4 10/23/2024    HCT 47.1 10/23/2024    MCV 87 10/23/2024     10/23/2024       CMP -  Lab Results   Component Value Date    CALCIUM 9.0 10/23/2024    PROT 6.9 10/23/2024    ALBUMIN 4.1 10/23/2024    AST 16 10/23/2024    ALT 23 10/23/2024    ALKPHOS 53 10/23/2024    BILITOT 0.6 10/23/2024       LIPID PANEL -   Lab Results   Component Value Date    CHOL 192 04/27/2024    TRIG 151 (H) 04/27/2024    HDL 41.0 04/27/2024    CHHDL 4.7 04/27/2024    LDLF 144 (H) 03/15/2022    VLDL 30 04/27/2024    NHDL 151 (H) 04/27/2024       RENAL FUNCTION PANEL -   Lab Results   Component Value Date    GLUCOSE 154 (H) 10/23/2024     10/23/2024    K 3.4 (L) 10/23/2024     10/23/2024    CO2 25 10/23/2024    ANIONGAP 11 10/23/2024    BUN 11 10/23/2024    CREATININE 0.93 10/23/2024    GFRMALE >90 09/21/2022    CALCIUM 9.0 10/23/2024    ALBUMIN 4.1 10/23/2024        Lab Results   Component Value Date    HGBA1C 5.6 03/15/2022         Lab review: I have Chemistry CMP:   Lab Results   Component Value Date    ALBUMIN 4.1 10/23/2024    CALCIUM 9.0 10/23/2024    CO2 25 10/23/2024    CREATININE 0.93 10/23/2024    GLUCOSE 154 (H) 10/23/2024    BILITOT 0.6 10/23/2024    PROT 6.9 10/23/2024    ALT 23 10/23/2024    AST 16 10/23/2024    ALKPHOS 53 10/23/2024   , Chemistry BMP   Lab Results   Component Value Date    GLUCOSE 154 (H) 10/23/2024    CALCIUM 9.0 10/23/2024    CO2 25 10/23/2024    CREATININE 0.93 10/23/2024   , CBC:  Lab Results   Component Value Date    WBC 5.2 10/23/2024    RBC 5.42 10/23/2024    HGB 16.4 10/23/2024    HCT 47.1  "10/23/2024    MCV 87 10/23/2024    MCH 30.3 10/23/2024    MCHC 34.8 10/23/2024    RDW 13.3 10/23/2024    NRBC 0.0 10/23/2024   , Lipids:   Lab Results   Component Value Date    CHOL 192 04/27/2024    HDL 41.0 04/27/2024    LDLCALC 121 (H) 04/27/2024    TRIG 151 (H) 04/27/2024   , and Cardiac Enzymes: No results found for: \"CKTOTAL\", \"CKMB\", \"CKMBINDEX\", \"TROPONINT\"    Assessment/Plan   Assessment & Plan  Hypertension, essential, benign  Risk factor modification: educational materials were provided to the patient.     Orders:    ECG 12 lead (Clinic Performed)    Follow Up In Cardiology; Future    Dyslipidemia  Risk factor modification: educational materials were provided to the patient.     Orders:    Follow Up In Cardiology; Future      This 59-year-old hypertensive hyperlipidemic man with a strong family history of premature CAD visited to different emergency room's in late October 2024 with left arm pain and had 2 separate workups including cardiac markers, EKGs, and radiographic studies on the separate days, and was released with atypical left arm pain.  Further workup disclosed evidence of cervical spondylosis for which he was treated with resolution of his symptoms of left arm pain.  His blood pressure is minimally elevated on exam today.  He has had some episodes of lightheadedness, but takes his antihypertensive medications simultaneously.  His primary care physician has ordered a coronary artery calcium score which will be done next month.  We will see him in the office in follow-up after his CT calcium score is completed.      Maciel Tiwari MD  "

## 2024-11-07 NOTE — ASSESSMENT & PLAN NOTE
Risk factor modification: educational materials were provided to the patient.     Orders:    ECG 12 lead (Clinic Performed)    Follow Up In Cardiology; Future

## 2024-11-09 PROBLEM — M54.12 CERVICAL RADICULOPATHY: Status: ACTIVE | Noted: 2024-11-09

## 2024-12-23 ENCOUNTER — HOSPITAL ENCOUNTER (OUTPATIENT)
Dept: RADIOLOGY | Facility: HOSPITAL | Age: 59
Discharge: HOME | End: 2024-12-23
Payer: COMMERCIAL

## 2024-12-23 DIAGNOSIS — Z00.00 WELL ADULT HEALTH CHECK: ICD-10-CM

## 2024-12-23 PROCEDURE — 75571 CT HRT W/O DYE W/CA TEST: CPT

## 2025-01-12 DIAGNOSIS — E78.2 MIXED HYPERLIPIDEMIA: ICD-10-CM

## 2025-01-13 RX ORDER — EZETIMIBE 10 MG/1
10 TABLET ORAL DAILY
Qty: 90 TABLET | Refills: 3 | Status: SHIPPED | OUTPATIENT
Start: 2025-01-13

## 2025-02-27 ENCOUNTER — HOSPITAL ENCOUNTER (OUTPATIENT)
Dept: RADIOLOGY | Facility: CLINIC | Age: 60
Discharge: HOME | End: 2025-02-27
Payer: COMMERCIAL

## 2025-02-27 ENCOUNTER — OFFICE VISIT (OUTPATIENT)
Dept: PRIMARY CARE | Facility: CLINIC | Age: 60
End: 2025-02-27
Payer: COMMERCIAL

## 2025-02-27 ENCOUNTER — TELEPHONE (OUTPATIENT)
Dept: PRIMARY CARE | Facility: CLINIC | Age: 60
End: 2025-02-27
Payer: COMMERCIAL

## 2025-02-27 VITALS
TEMPERATURE: 97.9 F | DIASTOLIC BLOOD PRESSURE: 82 MMHG | BODY MASS INDEX: 25.13 KG/M2 | SYSTOLIC BLOOD PRESSURE: 122 MMHG | HEART RATE: 100 BPM | WEIGHT: 170.2 LBS | RESPIRATION RATE: 20 BRPM | OXYGEN SATURATION: 96 %

## 2025-02-27 DIAGNOSIS — J20.8 ACUTE BRONCHITIS DUE TO OTHER SPECIFIED ORGANISMS: ICD-10-CM

## 2025-02-27 DIAGNOSIS — J20.8 ACUTE BRONCHITIS DUE TO OTHER SPECIFIED ORGANISMS: Primary | ICD-10-CM

## 2025-02-27 PROCEDURE — 3079F DIAST BP 80-89 MM HG: CPT | Performed by: FAMILY MEDICINE

## 2025-02-27 PROCEDURE — 1036F TOBACCO NON-USER: CPT | Performed by: FAMILY MEDICINE

## 2025-02-27 PROCEDURE — 3074F SYST BP LT 130 MM HG: CPT | Performed by: FAMILY MEDICINE

## 2025-02-27 PROCEDURE — 71046 X-RAY EXAM CHEST 2 VIEWS: CPT

## 2025-02-27 PROCEDURE — 99214 OFFICE O/P EST MOD 30 MIN: CPT | Performed by: FAMILY MEDICINE

## 2025-02-27 RX ORDER — METHYLPREDNISOLONE 4 MG/1
TABLET ORAL
COMMUNITY
Start: 2025-02-25

## 2025-02-27 RX ORDER — BENZONATATE 200 MG/1
CAPSULE ORAL
COMMUNITY
Start: 2025-02-06

## 2025-02-27 RX ORDER — FLUTICASONE PROPIONATE 50 MCG
1 SPRAY, SUSPENSION (ML) NASAL
COMMUNITY
Start: 2025-02-06 | End: 2025-03-08

## 2025-02-27 RX ORDER — DOXYCYCLINE 100 MG/1
100 CAPSULE ORAL 2 TIMES DAILY
Qty: 20 CAPSULE | Refills: 0 | Status: SHIPPED | OUTPATIENT
Start: 2025-02-27 | End: 2025-03-09

## 2025-02-27 RX ORDER — ALBUTEROL SULFATE 90 UG/1
INHALANT RESPIRATORY (INHALATION)
COMMUNITY
Start: 2025-02-25

## 2025-02-27 ASSESSMENT — ENCOUNTER SYMPTOMS
FATIGUE: 1
SHORTNESS OF BREATH: 1
HEADACHES: 1
COUGH: 1
CONSTIPATION: 0
NAUSEA: 0
DIFFICULTY URINATING: 0
VOMITING: 0
FEVER: 0
MYALGIAS: 1
SORE THROAT: 0
RHINORRHEA: 1
DIARRHEA: 0
WHEEZING: 0

## 2025-02-27 NOTE — TELEPHONE ENCOUNTER
Geena, patient's wife sent a message through her [employer] portal in regard to Desean's need for an appointment.  Pain in his back, coughing    NP asked that he schedule an appointment for 2/28.  When called he couldn't come in because he has to go to work.  They did a telehealth through his insurance.  He was given a steroid and inhaler.     Geena asked that just orders be placed for Xrays and per NP message he would need to be seen to be evaluated.    It was advised he go to the [facility] to be evaluated since he was unable to come into the office 2/28

## 2025-02-27 NOTE — ASSESSMENT & PLAN NOTE
Pt to take atbx as directed  get cxr today  May cont tessalon perles, mucinex, medrol dosepak and albuterol hfa   F/up with pcp if cont symptoms  Go to ER if any resp distress

## 2025-04-02 DIAGNOSIS — I10 ACCELERATED HYPERTENSION: ICD-10-CM

## 2025-04-02 DIAGNOSIS — I10 HYPERTENSION, ESSENTIAL, BENIGN: ICD-10-CM

## 2025-04-03 RX ORDER — METOPROLOL TARTRATE 25 MG/1
12.5 TABLET, FILM COATED ORAL 2 TIMES DAILY
Qty: 90 TABLET | Refills: 3 | Status: SHIPPED | OUTPATIENT
Start: 2025-04-03 | End: 2026-03-29

## 2025-04-03 RX ORDER — AMLODIPINE BESYLATE 5 MG/1
5 TABLET ORAL DAILY
Qty: 90 TABLET | Refills: 3 | Status: SHIPPED | OUTPATIENT
Start: 2025-04-03 | End: 2026-03-29

## 2025-04-24 ENCOUNTER — TELEPHONE (OUTPATIENT)
Dept: PRIMARY CARE | Facility: CLINIC | Age: 60
End: 2025-04-24
Payer: COMMERCIAL

## 2025-04-24 DIAGNOSIS — K21.9 GASTROESOPHAGEAL REFLUX DISEASE, UNSPECIFIED WHETHER ESOPHAGITIS PRESENT: ICD-10-CM

## 2025-04-25 DIAGNOSIS — Z12.5 ENCOUNTER FOR PROSTATE CANCER SCREENING: ICD-10-CM

## 2025-04-25 DIAGNOSIS — Z00.00 WELL ADULT HEALTH CHECK: Primary | ICD-10-CM

## 2025-04-25 RX ORDER — PANTOPRAZOLE SODIUM 40 MG/1
40 TABLET, DELAYED RELEASE ORAL DAILY
Qty: 90 TABLET | Refills: 3 | Status: SHIPPED | OUTPATIENT
Start: 2025-04-25 | End: 2026-04-25

## 2025-04-28 ENCOUNTER — TELEPHONE (OUTPATIENT)
Dept: PRIMARY CARE | Facility: CLINIC | Age: 60
End: 2025-04-28
Payer: COMMERCIAL

## 2025-04-28 DIAGNOSIS — I10 HYPERTENSION, ESSENTIAL, BENIGN: ICD-10-CM

## 2025-04-28 DIAGNOSIS — I10 ACCELERATED HYPERTENSION: ICD-10-CM

## 2025-04-28 RX ORDER — AMLODIPINE BESYLATE 5 MG/1
5 TABLET ORAL DAILY
Qty: 90 TABLET | Refills: 3 | Status: SHIPPED | OUTPATIENT
Start: 2025-04-28 | End: 2026-04-23

## 2025-04-28 RX ORDER — AMLODIPINE BESYLATE 5 MG/1
5 TABLET ORAL DAILY
Qty: 90 TABLET | Refills: 3 | Status: SHIPPED | OUTPATIENT
Start: 2025-04-28 | End: 2025-04-28 | Stop reason: SDUPTHER

## 2025-04-28 NOTE — TELEPHONE ENCOUNTER
Medication Refill sent in 4/3/25 for amlodipine 5 mg. The patients wife called in to say the pharmacy is showing it is not received, that theres nothing active on file for this drug. I verified the pharmacy with the patients wife and it shows the same info for all other medications sent. So we sent it to correct place they just have nothing showing so.  The pharmacy gave the patient some medication to make it through the weekend for the patients blood pressure. Can we send in another fill In the event this is a technical glitch that maybe the e-script didn't go through?

## 2025-05-02 ENCOUNTER — TELEPHONE (OUTPATIENT)
Dept: PRIMARY CARE | Facility: CLINIC | Age: 60
End: 2025-05-02
Payer: COMMERCIAL

## 2025-05-02 NOTE — TELEPHONE ENCOUNTER
[facility] is unable to  accept e-scripts    Please call or fax the following Rx    amLODIPine (Norvasc) 5 mg tablet

## 2025-05-02 NOTE — TELEPHONE ENCOUNTER
Script was faxed on 4.28.2025, called pharmacy to verify or get a different fax number and was advised that they did receive the physical fax and was unaware of how it got over looked but would get it filled today for patient.

## 2025-05-07 ENCOUNTER — APPOINTMENT (OUTPATIENT)
Dept: CARDIOLOGY | Facility: CLINIC | Age: 60
End: 2025-05-07
Payer: COMMERCIAL

## 2025-06-22 LAB
ALBUMIN SERPL-MCNC: 4.6 G/DL (ref 3.6–5.1)
ALP SERPL-CCNC: 65 U/L (ref 35–144)
ALT SERPL-CCNC: 26 U/L (ref 9–46)
ANION GAP SERPL CALCULATED.4IONS-SCNC: 6 MMOL/L (CALC) (ref 7–17)
AST SERPL-CCNC: 24 U/L (ref 10–35)
BILIRUB SERPL-MCNC: 0.7 MG/DL (ref 0.2–1.2)
BUN SERPL-MCNC: 11 MG/DL (ref 7–25)
CALCIUM SERPL-MCNC: 8.9 MG/DL (ref 8.6–10.3)
CHLORIDE SERPL-SCNC: 105 MMOL/L (ref 98–110)
CHOLEST SERPL-MCNC: 215 MG/DL
CHOLEST/HDLC SERPL: 4.6 (CALC)
CO2 SERPL-SCNC: 28 MMOL/L (ref 20–32)
CREAT SERPL-MCNC: 1.05 MG/DL (ref 0.7–1.3)
EGFRCR SERPLBLD CKD-EPI 2021: 82 ML/MIN/1.73M2
ERYTHROCYTE [DISTWIDTH] IN BLOOD BY AUTOMATED COUNT: 13.3 % (ref 11–15)
GLUCOSE SERPL-MCNC: 110 MG/DL (ref 65–99)
HCT VFR BLD AUTO: 48.8 % (ref 38.5–50)
HDLC SERPL-MCNC: 47 MG/DL
HGB BLD-MCNC: 16.4 G/DL (ref 13.2–17.1)
LDLC SERPL CALC-MCNC: 140 MG/DL (CALC)
MCH RBC QN AUTO: 30.6 PG (ref 27–33)
MCHC RBC AUTO-ENTMCNC: 33.6 G/DL (ref 32–36)
MCV RBC AUTO: 91 FL (ref 80–100)
NONHDLC SERPL-MCNC: 168 MG/DL (CALC)
PLATELET # BLD AUTO: 208 THOUSAND/UL (ref 140–400)
PMV BLD REES-ECKER: 9.5 FL (ref 7.5–12.5)
POTASSIUM SERPL-SCNC: 4.4 MMOL/L (ref 3.5–5.3)
PROT SERPL-MCNC: 7.2 G/DL (ref 6.1–8.1)
PSA SERPL-MCNC: 0.79 NG/ML
RBC # BLD AUTO: 5.36 MILLION/UL (ref 4.2–5.8)
SODIUM SERPL-SCNC: 139 MMOL/L (ref 135–146)
T4 FREE SERPL-MCNC: 1 NG/DL (ref 0.8–1.8)
TRIGL SERPL-MCNC: 151 MG/DL
TSH SERPL-ACNC: 6.45 MIU/L (ref 0.4–4.5)
WBC # BLD AUTO: 5.2 THOUSAND/UL (ref 3.8–10.8)

## 2025-06-30 ENCOUNTER — APPOINTMENT (OUTPATIENT)
Dept: PRIMARY CARE | Facility: CLINIC | Age: 60
End: 2025-06-30
Payer: COMMERCIAL

## 2025-06-30 VITALS
HEIGHT: 69 IN | BODY MASS INDEX: 25.92 KG/M2 | HEART RATE: 66 BPM | DIASTOLIC BLOOD PRESSURE: 86 MMHG | OXYGEN SATURATION: 96 % | WEIGHT: 175 LBS | SYSTOLIC BLOOD PRESSURE: 128 MMHG | RESPIRATION RATE: 16 BRPM

## 2025-06-30 DIAGNOSIS — R79.89 ABNORMAL TSH: ICD-10-CM

## 2025-06-30 DIAGNOSIS — Z23 IMMUNIZATION DUE: ICD-10-CM

## 2025-06-30 DIAGNOSIS — Z00.00 WELL ADULT HEALTH CHECK: Primary | ICD-10-CM

## 2025-06-30 DIAGNOSIS — R73.01 ELEVATED FASTING GLUCOSE: ICD-10-CM

## 2025-06-30 DIAGNOSIS — I10 HYPERTENSION, ESSENTIAL, BENIGN: ICD-10-CM

## 2025-06-30 DIAGNOSIS — E78.2 HYPERLIPIDEMIA, MIXED: ICD-10-CM

## 2025-06-30 PROBLEM — R06.09 DYSPNEA ON EXERTION: Status: RESOLVED | Noted: 2023-04-17 | Resolved: 2025-06-30

## 2025-06-30 PROBLEM — J20.8 ACUTE BRONCHITIS DUE TO OTHER SPECIFIED ORGANISMS: Status: RESOLVED | Noted: 2025-02-27 | Resolved: 2025-06-30

## 2025-06-30 LAB — POC HEMOGLOBIN A1C: 5.7 % (ref 4.2–6.5)

## 2025-06-30 PROCEDURE — 90471 IMMUNIZATION ADMIN: CPT | Performed by: INTERNAL MEDICINE

## 2025-06-30 PROCEDURE — 83036 HEMOGLOBIN GLYCOSYLATED A1C: CPT | Performed by: INTERNAL MEDICINE

## 2025-06-30 PROCEDURE — 3079F DIAST BP 80-89 MM HG: CPT | Performed by: INTERNAL MEDICINE

## 2025-06-30 PROCEDURE — 99396 PREV VISIT EST AGE 40-64: CPT | Performed by: INTERNAL MEDICINE

## 2025-06-30 PROCEDURE — 3008F BODY MASS INDEX DOCD: CPT | Performed by: INTERNAL MEDICINE

## 2025-06-30 PROCEDURE — 90750 HZV VACC RECOMBINANT IM: CPT | Performed by: INTERNAL MEDICINE

## 2025-06-30 PROCEDURE — 3074F SYST BP LT 130 MM HG: CPT | Performed by: INTERNAL MEDICINE

## 2025-06-30 ASSESSMENT — PATIENT HEALTH QUESTIONNAIRE - PHQ9
SUM OF ALL RESPONSES TO PHQ9 QUESTIONS 1 AND 2: 0
2. FEELING DOWN, DEPRESSED OR HOPELESS: NOT AT ALL
1. LITTLE INTEREST OR PLEASURE IN DOING THINGS: NOT AT ALL

## 2025-06-30 ASSESSMENT — PAIN SCALES - GENERAL: PAINLEVEL_OUTOF10: 0-NO PAIN

## 2025-06-30 ASSESSMENT — ENCOUNTER SYMPTOMS
SORE THROAT: 0
CHILLS: 0
FEVER: 0
SHORTNESS OF BREATH: 0
ABDOMINAL PAIN: 0
TROUBLE SWALLOWING: 0
PALPITATIONS: 0

## 2025-06-30 NOTE — PROGRESS NOTES
Subjective   Yeison Olivera is a 60 y.o. male who presents for Annual Exam.      History of Present Illness  The patient presents for an annual checkup.    Blood Sugar Monitoring and Dietary Modifications  He underwent blood work last week and has been monitoring his blood sugar levels at home, which have remained within normal limits. Dietary modifications have been made, including reducing the intake of sweets, and he has experienced a slight weight gain, currently weighing around 175 pounds.  - Onset: Last week (blood work).  - Character: Blood sugar levels within normal limits, slight weight gain.  - Alleviating/Aggravating Factors: Reducing intake of sweets.  - Severity: Weight gain to around 175 pounds.    Discontinuation of Metoprolol and Current Medication Regimen  He has discontinued metoprolol, a medication previously prescribed for his heart rate, as he believes his heart rate is stable. His current regimen includes two antihypertensive medications, losartan and amlodipine. Additionally, he takes pantoprazole for gastrointestinal issues and Zetia for cholesterol management. There is no need for medication refills at this time.  - Character: Discontinued metoprolol, stable heart rate.  - Alleviating/Aggravating Factors: Believes heart rate is stable.  - Severity: No need for medication refills.    Vaccination Status  He has not received the shingles or pneumonia vaccines.    History of Elevated Blood Pressure  He is not currently under the care of a urologist. There is a history of elevated blood pressure, which was noted during an emergency room visit in October of the previous year. He was subsequently started on metoprolol in the ED. He also consulted with a nurse practitioner at the [facility]. Blood pressure readings have been inconsistent, but his heart rate has remained within the 60 to 70 range. He reports feeling well overall and does not perceive any significant changes since discontinuing  "the medication.  - Onset: October of the previous year (elevated blood pressure noted).  - Character: Elevated blood pressure, inconsistent readings, heart rate within 60 to 70 range.  - Timing: Started on metoprolol in the ED, consulted with nurse practitioner.  - Severity: Feels well overall, no significant changes since discontinuing medication.    Colonoscopy Findings  He had a colonoscopy in December 2023, during which three polyps were found. The recommendation was to repeat the procedure in three years.  - Onset: December 2023 (colonoscopy).  - Character: Three polyps found.  - Timing: Recommendation to repeat procedure in three years.      Review of Systems   Constitutional:  Negative for chills and fever.   HENT:  Negative for sore throat and trouble swallowing.    Respiratory:  Negative for shortness of breath.    Cardiovascular:  Negative for chest pain, palpitations and leg swelling.   Gastrointestinal:  Negative for abdominal pain.   Skin:  Negative for rash.       Objective   /86   Pulse 66   Resp 16   Ht 1.753 m (5' 9\")   Wt 79.4 kg (175 lb)   SpO2 96%   BMI 25.84 kg/m²       Physical Exam  Constitutional:       Appearance: Normal appearance.   HENT:      Head: Normocephalic.      Right Ear: Tympanic membrane, ear canal and external ear normal.      Left Ear: Tympanic membrane, ear canal and external ear normal.      Nose: Nose normal.      Mouth/Throat:      Mouth: Mucous membranes are moist.      Pharynx: Oropharynx is clear.   Eyes:      Conjunctiva/sclera: Conjunctivae normal.   Cardiovascular:      Rate and Rhythm: Normal rate and regular rhythm.      Heart sounds: Normal heart sounds.   Pulmonary:      Effort: Pulmonary effort is normal.      Breath sounds: Normal breath sounds.   Musculoskeletal:         General: Normal range of motion.      Cervical back: Neck supple.   Skin:     General: Skin is warm and dry.   Neurological:      General: No focal deficit present.      Mental " Status: He is alert and oriented to person, place, and time.   Psychiatric:         Mood and Affect: Mood normal.         Assessment & Plan  Well adult health check         Elevated fasting glucose    Orders:    POCT glycosylated hemoglobin (Hb A1C) manually resulted    Hypertension, essential, benign         Hyperlipidemia, mixed         Immunization due    Orders:    Zoster vaccine, recombinant, adult (SHINGRIX)    Abnormal TSH    Orders:    TSH with reflex to Free T4 if abnormal; Future       Assessment & Plan  1. Annual checkup.  - Blood glucose levels have been slightly elevated, necessitating further investigation to rule out diabetes.  - Thyroid-stimulating hormone (TSH) levels are marginally above the normal range, indicating a potential need for thyroid hormone regulation.  - Lipid profile reveals an increase in LDL cholesterol from the 120s to the 140s, while triglycerides and HDL levels remain stable. Total cholesterol has risen to 215.  - Weight fluctuates between 170 and 175 pounds.  - Due for a colonoscopy on 12/11/2026.  - Will continue current regimen of vitamins, Zetia, losartan, pantoprazole, and amlodipine.  - A fingerstick test will be conducted in the office to assess hemoglobin A1c levels.  - Repeat thyroid function test will be scheduled in 3 months.  - Will receive the shingles vaccine today and return in 2 months for the second dose.  - Will verify insurance coverage for the pneumonia vaccine.    2. Hypertension: Stable.  - Currently taking losartan, amlodipine, and metoprolol for blood pressure management.  - Has stopped taking metoprolol as heart rate was fine without it.  - Will discontinue metoprolol as it appears it has not been taken.  - Blood pressure will be monitored in 6 months to ensure it remains controlled.    3. Elevated LDL cholesterol: Chronic.  - LDL cholesterol has increased from the 120s to the 140s.  - Will continue taking Zetia for cholesterol management.  - Dietary  modifications to reduce LDL levels were discussed.    4. Elevated thyroid-stimulating hormone (TSH): Chronic.  - TSH levels are slightly elevated at 6.45, although free T4 is within the normal range.  - Repeat thyroid function test will be scheduled in 3 months to monitor thyroid levels.    5. Health maintenance.  - Due for a colonoscopy on 12/11/2026.  - Will receive the shingles vaccine today and return in 2 months for the second dose.  - Will verify insurance coverage for the pneumonia vaccine.    Follow-up  - Follow up in 2 months for the second dose of the shingles vaccine.      Daniel Juan DO   This medical note was created with the assistance of artificial intelligence (AI) for documentation purposes. The content has been reviewed and confirmed by the healthcare provider for accuracy and completeness. Patient consented to the use of audio recording and use of AI during their visit.

## 2025-06-30 NOTE — PATIENT INSTRUCTIONS
Shingles Vaccine and Pneumonia Vaccine     I encourage you to stay active and healthy, and to follow these healthy habits:     Try to increase your intake of fish such as salmon and tuna and try to get 2 to 3 servings of fish per week.  Increase your intake of plant-based protein listed here:    Unprocessed nuts, walnuts, or almonds, Nuts and Seeds.      Green vegetables such as Broccoli, Peas, Greens, Spinach    Beans, Chickpeas, & Lentils    Other sources include Potatoes, Quinoa, Seaweed, Soymilk, Tempeh, and Tofu.  Increase foods higher in flavonoids found in black tea, green tea, apples, nuts, citrus fruit, berries, and dark chocolate.  You should avoid fried foods, and sugary or starchy foods and sugary drinks, and void saturated fats.    Try not to dine at restaurants more than once per month, and avoid fast food restaurants.      Try to get restful sleep approximately 7-9 hours every night.  Work towards getting 30 minutes of  moderate intensity exercise 4 to 5 days per week.  You should also try to exercise at least one hour per day with light walking.

## 2025-08-31 DIAGNOSIS — I10 HYPERTENSION, ESSENTIAL, BENIGN: ICD-10-CM

## 2025-08-31 DIAGNOSIS — I10 ACCELERATED HYPERTENSION: ICD-10-CM

## 2025-09-02 RX ORDER — AMLODIPINE BESYLATE 5 MG/1
5 TABLET ORAL DAILY
Qty: 30 TABLET | Refills: 3 | Status: SHIPPED | OUTPATIENT
Start: 2025-09-02

## 2026-01-05 ENCOUNTER — APPOINTMENT (OUTPATIENT)
Dept: PRIMARY CARE | Facility: CLINIC | Age: 61
End: 2026-01-05
Payer: COMMERCIAL

## 2026-01-06 ENCOUNTER — APPOINTMENT (OUTPATIENT)
Dept: PRIMARY CARE | Facility: CLINIC | Age: 61
End: 2026-01-06
Payer: COMMERCIAL